# Patient Record
Sex: FEMALE | Race: WHITE | NOT HISPANIC OR LATINO | Employment: PART TIME | URBAN - METROPOLITAN AREA
[De-identification: names, ages, dates, MRNs, and addresses within clinical notes are randomized per-mention and may not be internally consistent; named-entity substitution may affect disease eponyms.]

---
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
## 2019-02-26 ENCOUNTER — TELEPHONE (OUTPATIENT)
Dept: PEDIATRICS CLINIC | Facility: CLINIC | Age: 19
End: 2019-02-26

## 2019-02-26 NOTE — TELEPHONE ENCOUNTER
Left message on home number to have a parent or nidiadian to call back and schedule well visit  Patient is past due

## 2020-03-11 ENCOUNTER — TRANSCRIBE ORDERS (OUTPATIENT)
Dept: ADMINISTRATIVE | Age: 20
End: 2020-03-11

## 2020-03-11 ENCOUNTER — APPOINTMENT (OUTPATIENT)
Dept: LAB | Age: 20
End: 2020-03-11
Attending: PREVENTIVE MEDICINE

## 2020-03-11 DIAGNOSIS — A15.0 TB (PULMONARY TUBERCULOSIS): ICD-10-CM

## 2020-03-11 DIAGNOSIS — A15.0 TB (PULMONARY TUBERCULOSIS): Primary | ICD-10-CM

## 2020-03-11 PROCEDURE — 36415 COLL VENOUS BLD VENIPUNCTURE: CPT

## 2020-03-11 PROCEDURE — 86480 TB TEST CELL IMMUN MEASURE: CPT

## 2020-03-13 LAB
GAMMA INTERFERON BACKGROUND BLD IA-ACNC: 0.02 IU/ML
M TB IFN-G BLD-IMP: NEGATIVE
M TB IFN-G CD4+ BCKGRND COR BLD-ACNC: 0 IU/ML
M TB IFN-G CD4+ BCKGRND COR BLD-ACNC: 0 IU/ML
MITOGEN IGNF BCKGRD COR BLD-ACNC: >10 IU/ML

## 2020-10-06 ENCOUNTER — OFFICE VISIT (OUTPATIENT)
Dept: OBGYN CLINIC | Facility: CLINIC | Age: 20
End: 2020-10-06
Payer: COMMERCIAL

## 2020-10-06 VITALS
SYSTOLIC BLOOD PRESSURE: 116 MMHG | BODY MASS INDEX: 19.9 KG/M2 | DIASTOLIC BLOOD PRESSURE: 70 MMHG | WEIGHT: 126.8 LBS | HEIGHT: 67 IN

## 2020-10-06 DIAGNOSIS — Z01.419 WOMEN'S ANNUAL ROUTINE GYNECOLOGICAL EXAMINATION: ICD-10-CM

## 2020-10-06 DIAGNOSIS — Z72.51 UNPROTECTED SEXUAL INTERCOURSE: Primary | ICD-10-CM

## 2020-10-06 PROCEDURE — 87491 CHLMYD TRACH DNA AMP PROBE: CPT | Performed by: OBSTETRICS & GYNECOLOGY

## 2020-10-06 PROCEDURE — 87591 N.GONORRHOEAE DNA AMP PROB: CPT | Performed by: OBSTETRICS & GYNECOLOGY

## 2020-10-06 PROCEDURE — S0610 ANNUAL GYNECOLOGICAL EXAMINA: HCPCS | Performed by: OBSTETRICS & GYNECOLOGY

## 2020-10-07 LAB
C TRACH DNA SPEC QL NAA+PROBE: NEGATIVE
N GONORRHOEA DNA SPEC QL NAA+PROBE: NEGATIVE

## 2020-11-04 ENCOUNTER — TRANSCRIBE ORDERS (OUTPATIENT)
Dept: URGENT CARE | Facility: CLINIC | Age: 20
End: 2020-11-04

## 2020-11-04 DIAGNOSIS — Z02.1 ENCOUNTER FOR PRE-EMPLOYMENT EXAMINATION: Primary | ICD-10-CM

## 2020-11-04 PROCEDURE — U0003 INFECTIOUS AGENT DETECTION BY NUCLEIC ACID (DNA OR RNA); SEVERE ACUTE RESPIRATORY SYNDROME CORONAVIRUS 2 (SARS-COV-2) (CORONAVIRUS DISEASE [COVID-19]), AMPLIFIED PROBE TECHNIQUE, MAKING USE OF HIGH THROUGHPUT TECHNOLOGIES AS DESCRIBED BY CMS-2020-01-R: HCPCS | Performed by: NURSE PRACTITIONER

## 2020-11-06 LAB — SARS-COV-2 RNA SPEC QL NAA+PROBE: NOT DETECTED

## 2021-03-24 ENCOUNTER — IMMUNIZATIONS (OUTPATIENT)
Dept: FAMILY MEDICINE CLINIC | Facility: HOSPITAL | Age: 21
End: 2021-03-24

## 2021-03-24 DIAGNOSIS — Z23 ENCOUNTER FOR IMMUNIZATION: Primary | ICD-10-CM

## 2021-03-24 PROCEDURE — 0011A SARS-COV-2 / COVID-19 MRNA VACCINE (MODERNA) 100 MCG: CPT

## 2021-03-24 PROCEDURE — 91301 SARS-COV-2 / COVID-19 MRNA VACCINE (MODERNA) 100 MCG: CPT

## 2021-04-26 ENCOUNTER — IMMUNIZATIONS (OUTPATIENT)
Dept: FAMILY MEDICINE CLINIC | Facility: HOSPITAL | Age: 21
End: 2021-04-26

## 2021-04-26 DIAGNOSIS — Z23 ENCOUNTER FOR IMMUNIZATION: Primary | ICD-10-CM

## 2021-04-26 PROCEDURE — 0012A SARS-COV-2 / COVID-19 MRNA VACCINE (MODERNA) 100 MCG: CPT

## 2021-04-26 PROCEDURE — 91301 SARS-COV-2 / COVID-19 MRNA VACCINE (MODERNA) 100 MCG: CPT

## 2021-09-07 ENCOUNTER — OFFICE VISIT (OUTPATIENT)
Dept: OBGYN CLINIC | Facility: CLINIC | Age: 21
End: 2021-09-07
Payer: COMMERCIAL

## 2021-09-07 VITALS
BODY MASS INDEX: 19.3 KG/M2 | SYSTOLIC BLOOD PRESSURE: 102 MMHG | HEIGHT: 67 IN | DIASTOLIC BLOOD PRESSURE: 64 MMHG | WEIGHT: 123 LBS

## 2021-09-07 DIAGNOSIS — Z11.3 SCREENING FOR STDS (SEXUALLY TRANSMITTED DISEASES): ICD-10-CM

## 2021-09-07 DIAGNOSIS — Z34.82 ENCOUNTER FOR SUPERVISION OF OTHER NORMAL PREGNANCY, SECOND TRIMESTER: Primary | ICD-10-CM

## 2021-09-07 DIAGNOSIS — N91.2 AMENORRHEA: ICD-10-CM

## 2021-09-07 LAB — SL AMB POCT URINE HCG: POSITIVE

## 2021-09-07 PROCEDURE — 81025 URINE PREGNANCY TEST: CPT | Performed by: OBSTETRICS & GYNECOLOGY

## 2021-09-07 PROCEDURE — 87591 N.GONORRHOEAE DNA AMP PROB: CPT | Performed by: OBSTETRICS & GYNECOLOGY

## 2021-09-07 PROCEDURE — 87491 CHLMYD TRACH DNA AMP PROBE: CPT | Performed by: OBSTETRICS & GYNECOLOGY

## 2021-09-07 PROCEDURE — 99213 OFFICE O/P EST LOW 20 MIN: CPT | Performed by: OBSTETRICS & GYNECOLOGY

## 2021-09-07 NOTE — PROGRESS NOTES
Assessment/Plan:     There are no diagnoses linked to this encounter  22 yo female   Amenorrhea pos pregnancy iup 9w  lmp 2021  Prior 1   Plan    GC/ CT   Prenatal vitamin daily   Return to office for C OC      Subjective:      Patient ID: Suleiman Harding is a 23 y o  female  HPI   18-year-old female presents to the office today secondary to amenorrhea positive pregnancy test  LMP 2021  Denies any vaginal bleeding denies any vaginal itching or odor or discharge denies any pelvic pain denies any nausea vomiting denies any diarrhea or constipation    The following portions of the patient's history were reviewed and updated as appropriate: allergies, current medications, past family history, past medical history, past social history, past surgical history and problem list     Review of Systems      Objective:      /64 (BP Location: Left arm, Patient Position: Sitting, Cuff Size: Adult)   Ht 5' 7" (1 702 m)   Wt 55 8 kg (123 lb)   LMP 2021   BMI 19 26 kg/m²          Physical Exam  Constitutional:       Appearance: She is well-developed  Abdominal:      General: There is no distension  Palpations: Abdomen is soft  Tenderness: There is no abdominal tenderness  Genitourinary:     Labia:         Right: No rash, tenderness or lesion  Left: No rash, tenderness or lesion  Vagina: No signs of injury  No vaginal discharge, erythema or tenderness  Cervix: No cervical motion tenderness, discharge or friability  Adnexa:         Right: No mass, tenderness or fullness  Left: No mass, tenderness or fullness  Comments:  Bedside ultrasound performed IUP 9 weeks 3 days fetal heart rate 150 beats per minute  Neurological:      Mental Status: She is alert and oriented to person, place, and time     Psychiatric:         Behavior: Behavior normal

## 2021-09-08 LAB
C TRACH DNA SPEC QL NAA+PROBE: NEGATIVE
N GONORRHOEA DNA SPEC QL NAA+PROBE: NEGATIVE

## 2021-09-13 ENCOUNTER — INITIAL PRENATAL (OUTPATIENT)
Dept: OBGYN CLINIC | Facility: CLINIC | Age: 21
End: 2021-09-13
Payer: COMMERCIAL

## 2021-09-13 VITALS
WEIGHT: 125 LBS | SYSTOLIC BLOOD PRESSURE: 110 MMHG | DIASTOLIC BLOOD PRESSURE: 70 MMHG | HEIGHT: 67 IN | BODY MASS INDEX: 19.62 KG/M2

## 2021-09-13 DIAGNOSIS — Z33.1 NORMAL PREGNANCY, INCIDENTAL: Primary | ICD-10-CM

## 2021-09-13 DIAGNOSIS — Z34.81 MULTIGRAVIDA IN FIRST TRIMESTER: ICD-10-CM

## 2021-09-13 PROCEDURE — T1001 NURSING ASSESSMENT/EVALUATN: HCPCS | Performed by: OBSTETRICS & GYNECOLOGY

## 2021-09-13 NOTE — PROGRESS NOTES
OB Intake    Patient presents for OB intake interview  Accompanied by: HERSELF   Planned  pregnancy, FOB involved and supportive      Hx of  delivery prior to 36 weeks 6 days: no  Hx of hypertension:  no  Patient's last menstrual period was 2021  Estimated Date of Delivery: 2022  Signs and Symptoms of pregnancy:  - No SWELLING G OR DIZZINESS, NO VISUAL PROBLEMS,HAS NAUSEA WITH NO VOMITING   - Constipation: yes  - Headaches: No  - Cramping/spotting: No  - PICA cravings: No  - Diabetes:    History of gestational diabetes :No   BMI >35  :No   Advance maternal age >35 :No   First degree relative with type 2 diabetes :No   History of PCOS :No   Current metformin use :NO   Prior history of macrosomia or LGA :No    Prenatal labs including: CBC,ABO, Rubella, Hepatitis, RPR, HIV,cf,sma carrier screen, toxoplasmosis, varicella, hemoglobin electrophoresis  Last Pap:  2021  Tdap - counseled to be given after 27 weeks  Influenza vaccine discussed and information sheet given  Vaccinated: NO  COVID Vaccine : yes 3/24 &   Hx of MRSA: NO  Dental visit with last 6 months -  no, recommendations discussed  CATS : YES  Interview education:  [facility] Pregnancy Essentials booklet reviewed and explained  Handouts given at today's visit     [facility] phone application guide     [address] [place] [address] support center   [facility]        Sequential screening pamphlet                   Cystic fibrosis information    [facility]: Yes  Oscar Smiley form submitted: Yes    Mychart activated: Joe Salinas done by : Lidia Johnson LPN       82/60/96

## 2021-11-11 ENCOUNTER — TELEPHONE (OUTPATIENT)
Dept: OBGYN CLINIC | Facility: CLINIC | Age: 21
End: 2021-11-11

## 2021-12-20 ENCOUNTER — TELEMEDICINE (OUTPATIENT)
Dept: FAMILY MEDICINE CLINIC | Facility: CLINIC | Age: 21
End: 2021-12-20
Payer: COMMERCIAL

## 2021-12-20 VITALS — HEIGHT: 67 IN | TEMPERATURE: 97.6 F | BODY MASS INDEX: 18.83 KG/M2 | WEIGHT: 120 LBS

## 2021-12-20 DIAGNOSIS — R05.9 COUGH: Primary | ICD-10-CM

## 2021-12-20 PROCEDURE — 1036F TOBACCO NON-USER: CPT | Performed by: INTERNAL MEDICINE

## 2021-12-20 PROCEDURE — 3008F BODY MASS INDEX DOCD: CPT | Performed by: INTERNAL MEDICINE

## 2021-12-20 PROCEDURE — 99214 OFFICE O/P EST MOD 30 MIN: CPT | Performed by: INTERNAL MEDICINE

## 2021-12-20 PROCEDURE — 3725F SCREEN DEPRESSION PERFORMED: CPT | Performed by: INTERNAL MEDICINE

## 2021-12-20 RX ORDER — AZITHROMYCIN 250 MG/1
TABLET, FILM COATED ORAL
Qty: 6 TABLET | Refills: 0 | Status: SHIPPED | OUTPATIENT
Start: 2021-12-20 | End: 2021-12-24

## 2021-12-20 RX ORDER — ALBUTEROL SULFATE 90 UG/1
2 AEROSOL, METERED RESPIRATORY (INHALATION) EVERY 6 HOURS PRN
Qty: 18 G | Refills: 5 | Status: SHIPPED | OUTPATIENT
Start: 2021-12-20

## 2022-01-20 ENCOUNTER — TELEPHONE (OUTPATIENT)
Dept: FAMILY MEDICINE CLINIC | Facility: CLINIC | Age: 22
End: 2022-01-20

## 2022-01-20 DIAGNOSIS — Z03.818 ENCOUNTER FOR OBSERVATION FOR SUSPECTED EXPOSURE TO OTHER BIOLOGICAL AGENTS RULED OUT: Primary | ICD-10-CM

## 2022-01-20 PROCEDURE — U0005 INFEC AGEN DETEC AMPLI PROBE: HCPCS | Performed by: INTERNAL MEDICINE

## 2022-01-20 PROCEDURE — U0003 INFECTIOUS AGENT DETECTION BY NUCLEIC ACID (DNA OR RNA); SEVERE ACUTE RESPIRATORY SYNDROME CORONAVIRUS 2 (SARS-COV-2) (CORONAVIRUS DISEASE [COVID-19]), AMPLIFIED PROBE TECHNIQUE, MAKING USE OF HIGH THROUGHPUT TECHNOLOGIES AS DESCRIBED BY CMS-2020-01-R: HCPCS | Performed by: INTERNAL MEDICINE

## 2022-01-20 NOTE — TELEPHONE ENCOUNTER
Today did a home Covid test & it was positive  She's asking if she can get order for PCR  Has body aches, sore throat, cough, ha   Can she get an order?

## 2022-04-19 ENCOUNTER — APPOINTMENT (EMERGENCY)
Dept: CT IMAGING | Facility: HOSPITAL | Age: 22
End: 2022-04-19
Payer: MEDICARE

## 2022-04-19 ENCOUNTER — HOSPITAL ENCOUNTER (EMERGENCY)
Facility: HOSPITAL | Age: 22
Discharge: HOME | End: 2022-04-19
Attending: INTERNAL MEDICINE
Payer: MEDICARE

## 2022-04-19 VITALS
WEIGHT: 118.39 LBS | SYSTOLIC BLOOD PRESSURE: 94 MMHG | DIASTOLIC BLOOD PRESSURE: 58 MMHG | HEART RATE: 98 BPM | RESPIRATION RATE: 17 BRPM | OXYGEN SATURATION: 99 % | BODY MASS INDEX: 17.94 KG/M2 | HEIGHT: 68 IN | TEMPERATURE: 98 F

## 2022-04-19 DIAGNOSIS — R11.2 NAUSEA AND VOMITING, UNSPECIFIED VOMITING TYPE: ICD-10-CM

## 2022-04-19 DIAGNOSIS — R10.31 RIGHT LOWER QUADRANT ABDOMINAL PAIN: Primary | ICD-10-CM

## 2022-04-19 LAB
ALBUMIN SERPL BCP-MCNC: 4.5 G/DL (ref 3.5–5)
ALP SERPL-CCNC: 52 U/L (ref 34–104)
ALT SERPL W P-5'-P-CCNC: 12 U/L (ref 7–52)
ANION GAP SERPL CALCULATED.3IONS-SCNC: 9 MMOL/L (ref 4–13)
AST SERPL W P-5'-P-CCNC: 15 U/L (ref 13–39)
BASOPHILS # BLD MANUAL: 0 THOUSAND/UL (ref 0–0.1)
BASOPHILS NFR MAR MANUAL: 0 % (ref 0–1)
BILIRUB SERPL-MCNC: 0.85 MG/DL (ref 0.2–1)
BILIRUB UR QL STRIP: NEGATIVE
BUN SERPL-MCNC: 19 MG/DL (ref 5–25)
CALCIUM SERPL-MCNC: 9.3 MG/DL (ref 8.4–10.2)
CHLORIDE SERPL-SCNC: 104 MMOL/L (ref 96–108)
CLARITY UR: CLEAR
CO2 SERPL-SCNC: 26 MMOL/L (ref 21–32)
COLOR UR: YELLOW
CREAT SERPL-MCNC: 0.78 MG/DL (ref 0.6–1.3)
EOSINOPHIL # BLD MANUAL: 0 THOUSAND/UL (ref 0–0.4)
EOSINOPHIL NFR BLD MANUAL: 0 % (ref 0–6)
ERYTHROCYTE [DISTWIDTH] IN BLOOD BY AUTOMATED COUNT: 12.3 % (ref 11.6–15.1)
EXT PREG TEST URINE: NEGATIVE
EXT. CONTROL ED NAV: NORMAL
GFR SERPL CREATININE-BSD FRML MDRD: 108 ML/MIN/1.73SQ M
GLUCOSE SERPL-MCNC: 118 MG/DL (ref 65–140)
GLUCOSE UR STRIP-MCNC: NEGATIVE MG/DL
HCT VFR BLD AUTO: 45.3 % (ref 34.8–46.1)
HGB BLD-MCNC: 14.8 G/DL (ref 11.5–15.4)
HGB UR QL STRIP.AUTO: NEGATIVE
KETONES UR STRIP-MCNC: NEGATIVE MG/DL
LEUKOCYTE ESTERASE UR QL STRIP: NEGATIVE
LIPASE SERPL-CCNC: 37 U/L (ref 11–82)
LYMPHOCYTES # BLD AUTO: 0.48 THOUSAND/UL (ref 0.6–4.47)
LYMPHOCYTES # BLD AUTO: 4 % (ref 14–44)
MCH RBC QN AUTO: 29 PG (ref 26.8–34.3)
MCHC RBC AUTO-ENTMCNC: 32.7 G/DL (ref 31.4–37.4)
MCV RBC AUTO: 89 FL (ref 82–98)
METAMYELOCYTES NFR BLD MANUAL: 1 % (ref 0–1)
MONOCYTES # BLD AUTO: 0.24 THOUSAND/UL (ref 0–1.22)
MONOCYTES NFR BLD: 2 % (ref 4–12)
NEUTROPHILS # BLD MANUAL: 11.23 THOUSAND/UL (ref 1.85–7.62)
NEUTS BAND NFR BLD MANUAL: 10 % (ref 0–8)
NEUTS SEG NFR BLD AUTO: 83 % (ref 43–75)
NITRITE UR QL STRIP: NEGATIVE
OVALOCYTES BLD QL SMEAR: PRESENT
PH UR STRIP.AUTO: 6 [PH]
PLATELET # BLD AUTO: 214 THOUSANDS/UL (ref 149–390)
PLATELET BLD QL SMEAR: ADEQUATE
PMV BLD AUTO: 10.9 FL (ref 8.9–12.7)
POIKILOCYTOSIS BLD QL SMEAR: PRESENT
POTASSIUM SERPL-SCNC: 4 MMOL/L (ref 3.5–5.3)
PROT SERPL-MCNC: 7.1 G/DL (ref 6.4–8.4)
PROT UR STRIP-MCNC: NEGATIVE MG/DL
RBC # BLD AUTO: 5.1 MILLION/UL (ref 3.81–5.12)
RBC MORPH BLD: PRESENT
SODIUM SERPL-SCNC: 139 MMOL/L (ref 135–147)
SP GR UR STRIP.AUTO: 1.02 (ref 1–1.03)
UROBILINOGEN UR QL STRIP.AUTO: 0.2 E.U./DL
WBC # BLD AUTO: 12.08 THOUSAND/UL (ref 4.31–10.16)

## 2022-04-19 PROCEDURE — G1004 CDSM NDSC: HCPCS

## 2022-04-19 PROCEDURE — 85007 BL SMEAR W/DIFF WBC COUNT: CPT | Performed by: INTERNAL MEDICINE

## 2022-04-19 PROCEDURE — 99284 EMERGENCY DEPT VISIT MOD MDM: CPT | Performed by: INTERNAL MEDICINE

## 2022-04-19 PROCEDURE — 81003 URINALYSIS AUTO W/O SCOPE: CPT | Performed by: INTERNAL MEDICINE

## 2022-04-19 PROCEDURE — 74177 CT ABD & PELVIS W/CONTRAST: CPT

## 2022-04-19 PROCEDURE — 85027 COMPLETE CBC AUTOMATED: CPT | Performed by: INTERNAL MEDICINE

## 2022-04-19 PROCEDURE — 81025 URINE PREGNANCY TEST: CPT | Performed by: INTERNAL MEDICINE

## 2022-04-19 PROCEDURE — 83690 ASSAY OF LIPASE: CPT | Performed by: INTERNAL MEDICINE

## 2022-04-19 PROCEDURE — 36415 COLL VENOUS BLD VENIPUNCTURE: CPT | Performed by: INTERNAL MEDICINE

## 2022-04-19 PROCEDURE — 99284 EMERGENCY DEPT VISIT MOD MDM: CPT

## 2022-04-19 PROCEDURE — 96361 HYDRATE IV INFUSION ADD-ON: CPT

## 2022-04-19 PROCEDURE — 96374 THER/PROPH/DIAG INJ IV PUSH: CPT

## 2022-04-19 PROCEDURE — 80053 COMPREHEN METABOLIC PANEL: CPT | Performed by: INTERNAL MEDICINE

## 2022-04-19 PROCEDURE — 96375 TX/PRO/DX INJ NEW DRUG ADDON: CPT

## 2022-04-19 RX ORDER — ONDANSETRON 4 MG/1
4 TABLET, FILM COATED ORAL EVERY 6 HOURS
Qty: 12 TABLET | Refills: 0 | Status: SHIPPED | OUTPATIENT
Start: 2022-04-19 | End: 2022-05-03

## 2022-04-19 RX ORDER — ONDANSETRON 2 MG/ML
4 INJECTION INTRAMUSCULAR; INTRAVENOUS ONCE
Status: COMPLETED | OUTPATIENT
Start: 2022-04-19 | End: 2022-04-19

## 2022-04-19 RX ADMIN — SODIUM CHLORIDE 1000 ML: 0.9 INJECTION, SOLUTION INTRAVENOUS at 11:29

## 2022-04-19 RX ADMIN — IOHEXOL 100 ML: 350 INJECTION, SOLUTION INTRAVENOUS at 14:00

## 2022-04-19 RX ADMIN — FAMOTIDINE 20 MG: 10 INJECTION, SOLUTION INTRAVENOUS at 11:30

## 2022-04-19 RX ADMIN — ONDANSETRON 4 MG: 2 INJECTION INTRAMUSCULAR; INTRAVENOUS at 11:30

## 2022-04-19 NOTE — ED PROVIDER NOTES
History  Chief Complaint   Patient presents with    Vomiting     Pt  states started vomiting at 3 am      This is 17y old came for having vomiting since 3:00am , can not stop it , pt has no diarrhea or constipation and she moved her bowel this morning  Pt has no fever  Pt has epigastric pain  Pt denies urinary symptoms  Pt has no abdominal surgery  Pt has h/o of anemia , depression and asthma  LMP end of last month  Pt last meal last night and she did not vomit after that  Pt denies sob, chest pain  Prior to Admission Medications   Prescriptions Last Dose Informant Patient Reported? Taking? Prenatal Vit-Fe Fumarate-FA (PRENATAL VITAMINS PO)   Yes No   Sig: Take by mouth   albuterol (Ventolin HFA) 90 mcg/act inhaler   No No   Sig: Inhale 2 puffs every 6 (six) hours as needed for wheezing      Facility-Administered Medications: None       Past Medical History:   Diagnosis Date    Anemia     Depression     Polycystic ovary syndrome        History reviewed  No pertinent surgical history  Family History   Problem Relation Age of Onset    Diabetes Mother     Cervical cancer Mother     Heart disease Brother     No Known Problems Paternal Grandmother     No Known Problems Father     No Known Problems Sister     No Known Problems Daughter     Heart attack Maternal Grandmother     Heart attack Maternal Grandfather     No Known Problems Paternal Grandfather     No Known Problems Sister     No Known Problems Brother     No Known Problems Brother      I have reviewed and agree with the history as documented      E-Cigarette/Vaping    E-Cigarette Use Current Every Day User      E-Cigarette/Vaping Substances    Nicotine Yes     THC No     CBD No     Flavoring Yes      Social History     Tobacco Use    Smoking status: Former Smoker     Packs/day: 0 00     Years: 2 00     Pack years: 0 00     Types: Cigarettes    Smokeless tobacco: Former User    Tobacco comment: Quit    hyears ago    Vaping Use  Vaping Use: Every day    Substances: Nicotine, Flavoring   Substance Use Topics    Alcohol use: Yes     Alcohol/week: 0 0 standard drinks     Comment: rare    Drug use: Never     Comment: none       Review of Systems   Constitutional: Negative for fatigue and fever  HENT: Negative for postnasal drip, rhinorrhea, sinus pressure, sinus pain, sneezing, sore throat and trouble swallowing  Eyes: Negative for visual disturbance  Respiratory: Negative for cough and shortness of breath  Cardiovascular: Negative for chest pain and palpitations  Gastrointestinal: Positive for abdominal pain, nausea and vomiting  Negative for diarrhea  Genitourinary: Negative for difficulty urinating, dysuria, flank pain, frequency and hematuria  Musculoskeletal: Negative for back pain, myalgias, neck pain and neck stiffness  Skin: Negative for color change, pallor and rash  Neurological: Negative for dizziness, weakness, light-headedness and headaches  Hematological: Negative for adenopathy  Does not bruise/bleed easily  Psychiatric/Behavioral: Negative for agitation and behavioral problems  Physical Exam  Physical Exam  Vitals and nursing note reviewed  Constitutional:       General: She is not in acute distress  Appearance: She is well-developed and normal weight  She is not ill-appearing, toxic-appearing or diaphoretic  HENT:      Head: Normocephalic and atraumatic  Right Ear: Tympanic membrane and ear canal normal       Left Ear: Tympanic membrane and ear canal normal       Nose: Nose normal  No congestion or rhinorrhea  Mouth/Throat:      Mouth: Mucous membranes are moist       Pharynx: No oropharyngeal exudate or posterior oropharyngeal erythema  Eyes:      General: No scleral icterus  Right eye: No discharge  Left eye: No discharge  Pupils: Pupils are equal, round, and reactive to light  Neck:      Vascular: No carotid bruit     Cardiovascular:      Rate and Rhythm: Normal rate and regular rhythm  Heart sounds: Normal heart sounds  No murmur heard  No friction rub  No gallop  Pulmonary:      Effort: Pulmonary effort is normal  No respiratory distress  Breath sounds: Normal breath sounds  No wheezing, rhonchi or rales  Chest:      Chest wall: No tenderness  Abdominal:      General: Bowel sounds are normal  There is no distension  Palpations: Abdomen is soft  There is no mass  Tenderness: There is no abdominal tenderness  There is no right CVA tenderness, left CVA tenderness, guarding or rebound  Hernia: No hernia is present  Musculoskeletal:         General: No swelling, tenderness, deformity or signs of injury  Normal range of motion  Cervical back: Normal range of motion and neck supple  No rigidity or tenderness  Right lower leg: No edema  Left lower leg: No edema  Lymphadenopathy:      Cervical: No cervical adenopathy  Skin:     General: Skin is warm and dry  Capillary Refill: Capillary refill takes less than 2 seconds  Coloration: Skin is not jaundiced or pale  Findings: No bruising, erythema, lesion or rash  Neurological:      General: No focal deficit present  Mental Status: She is alert and oriented to person, place, and time     Psychiatric:         Mood and Affect: Mood normal          Behavior: Behavior normal          Vital Signs  ED Triage Vitals   Temperature Pulse Respirations Blood Pressure SpO2   04/19/22 1116 04/19/22 1116 04/19/22 1116 04/19/22 1116 04/19/22 1116   98 °F (36 7 °C) (!) 113 16 97/60 99 %      Temp Source Heart Rate Source Patient Position - Orthostatic VS BP Location FiO2 (%)   04/19/22 1116 04/19/22 1116 04/19/22 1116 04/19/22 1408 --   Oral Monitor Sitting Left arm       Pain Score       04/19/22 1116       No Pain           Vitals:    04/19/22 1116 04/19/22 1408   BP: 97/60 94/58   Pulse: (!) 113 98   Patient Position - Orthostatic VS: Sitting Lying Visual Acuity      ED Medications  Medications   sodium chloride 0 9 % bolus 1,000 mL (0 mL Intravenous Stopped 4/19/22 1300)   ondansetron (ZOFRAN) injection 4 mg (4 mg Intravenous Given 4/19/22 1130)   famotidine (PEPCID) injection 20 mg (20 mg Intravenous Given 4/19/22 1130)   iohexol (OMNIPAQUE) 350 MG/ML injection (SINGLE-DOSE) 100 mL (100 mL Intravenous Given 4/19/22 1400)       Diagnostic Studies  Results Reviewed     Procedure Component Value Units Date/Time    UA w Reflex to Microscopic w Reflex to Culture [[id number]]  (Normal) Collected: 04/19/22 1222    Lab Status: Final result Specimen: Urine, Clean Catch Updated: 04/19/22 1238     Color, UA Yellow     Clarity, UA Clear     Specific Gravity, UA 1 025     pH, UA 6 0     Leukocytes, UA Negative     Nitrite, UA Negative     Protein, UA Negative mg/dl      Glucose, UA Negative mg/dl      Ketones, UA Negative mg/dl      Urobilinogen, UA 0 2 E U /dl      Bilirubin, UA Negative     Blood, UA Negative    POCT pregnancy, urine [[id number]]  (Normal) Resulted: 04/19/22 1233    Lab Status: Final result Updated: 04/19/22 1234     EXT PREG TEST UR (Ref: Negative) negATIVE     Control valid    CBC and differential [[id number]]  (Abnormal) Collected: 04/19/22 1129    Lab Status: Final result Specimen: Blood from Arm, Left Updated: 04/19/22 1212     WBC 12 08 Thousand/uL      RBC 5 10 Million/uL      Hemoglobin 14 8 g/dL      Hematocrit 45 3 %      MCV 89 fL      MCH 29 0 pg      MCHC 32 7 g/dL      RDW 12 3 %      MPV 10 9 fL      Platelets 596 Thousands/uL     Narrative: This is an appended report  These results have been appended to a previously verified report      Manual Differential(PHLEBS Do Not Order) [[id number]]  (Abnormal) Collected: 04/19/22 1129    Lab Status: Final result Specimen: Blood from Arm, Left Updated: 04/19/22 1212     Segmented % 83 %      Bands % 10 %      Lymphocytes % 4 %      Monocytes % 2 %      Eosinophils, % 0 %      Basophils % 0 %      Metamyelocytes% 1 %      Absolute Neutrophils 11 23 Thousand/uL      Lymphocytes Absolute 0 48 Thousand/uL      Monocytes Absolute 0 24 Thousand/uL      Eosinophils Absolute 0 00 Thousand/uL      Basophils Absolute 0 00 Thousand/uL      Total Counted --     RBC Morphology Present     Ovalocytes Present     Poikilocytes Present     Platelet Estimate Adequate    Comprehensive metabolic panel [[id number]] Collected: 04/19/22 1129    Lab Status: Final result Specimen: Blood from Arm, Left Updated: 04/19/22 1203     Sodium 139 mmol/L      Potassium 4 0 mmol/L      Chloride 104 mmol/L      CO2 26 mmol/L      ANION GAP 9 mmol/L      BUN 19 mg/dL      Creatinine 0 78 mg/dL      Glucose 118 mg/dL      Calcium 9 3 mg/dL      AST 15 U/L      ALT 12 U/L      Alkaline Phosphatase 52 U/L      Total Protein 7 1 g/dL      Albumin 4 5 g/dL      Total Bilirubin 0 85 mg/dL      eGFR 108 ml/min/1 73sq m     Narrative:      [place] guidelines for Chronic Kidney Disease (CKD):     Stage 1 with normal or high GFR (GFR > 90 mL/min/1 73 square meters)    Stage 2 Mild CKD (GFR = 60-89 mL/min/1 73 square meters)    Stage 3A Moderate CKD (GFR = 45-59 mL/min/1 73 square meters)    Stage 3B Moderate CKD (GFR = 30-44 mL/min/1 73 square meters)    Stage 4 Severe CKD (GFR = 15-29 mL/min/1 73 square meters)    Stage 5 End Stage CKD (GFR <15 mL/min/1 73 square meters)  Note: GFR calculation is accurate only with a steady state creatinine    Lipase [[id number]]  (Normal) Collected: 04/19/22 1129    Lab Status: Final result Specimen: Blood from Arm, Left Updated: 04/19/22 1203     Lipase 37 u/L                  CT abdomen pelvis w contrast   Final Result by Elio Vences MD (04/19 1430)      1  No acute inflammatory changes within the abdomen or pelvis  No evidence of appendicitis   2  No bowel obstruction or hydronephrosis   3   Nonspecific small amount of free fluid in the pelvis may be physiologic Workstation performed: [id number]                    Procedures  Procedures         ED Course  ED Course as of 04/20/22 0916   Tue Apr 19, 2022   1308 Pt re examined and abdomen; mild tenderness at RLQ area  No rebound tenderness, will go for CT abdomen and pelvis  [facility]  Number of Diagnoses or Management Options  Diagnosis management comments: This is 17y old came for having vomitig since 3:00am  On arrival abdominal exam was normal  2nd exam shows tenderness at RLQ  Pt has WBC 12 8k, with L shift  So ordered ct Abdomen and pelvis to R/O Appendicitis  CT came back negative for appendicitis  Pt felt better with treatement  Will discharge home on zofran  All questions and concerns of pt had been addressed  Amount and/or Complexity of Data Reviewed  Clinical lab tests: ordered  Tests in the radiology section of CPT®: ordered and reviewed    Risk of Complications, Morbidity, and/or Mortality  Presenting problems: moderate  Diagnostic procedures: moderate  Management options: moderate        Disposition  Final diagnoses:   Right lower quadrant abdominal pain   Nausea and vomiting, unspecified vomiting type     Time reflects when diagnosis was documented in both MDM as applicable and the Disposition within this note     Time User Action Codes Description Comment    4/19/2022  2:47 PM Cherelle Grant Add [R10 31] Right lower quadrant abdominal pain     4/19/2022  2:48 PM Cherelle Grant Add [R11 2] Nausea and vomiting, unspecified vomiting type       ED Disposition     ED Disposition Condition Date/Time Comment    Discharge Stable Tue Apr 19, 2022  2:47 PM Radha Zhu discharge to home/self care              Follow-up Information     Follow up With Specialties Details Why Contact Info    Maite Hermosillo MD Internal Medicine, Pediatrics In 3 days  [address]  [address] [address]  [phone]            Discharge Medication List as of 4/19/2022  2:49 PM      START taking these medications    Details   ondansetron (ZOFRAN) 4 mg tablet Take 1 tablet (4 mg total) by mouth every 6 (six) hours for 12 doses, Starting Tue 4/19/2022, Until Fri 4/22/2022, Normal         CONTINUE these medications which have NOT CHANGED    Details   albuterol (Ventolin HFA) 90 mcg/act inhaler Inhale 2 puffs every 6 (six) hours as needed for wheezing, Starting Mon 12/20/2021, Normal      Prenatal Vit-Fe Fumarate-FA (PRENATAL VITAMINS PO) Take by mouth, Historical Med             No discharge procedures on file      PDMP Review     None          ED Provider  Electronically Signed by           Charlyne Ahumada, MD  04/20/22 9457

## 2022-04-19 NOTE — DISCHARGE INSTRUCTIONS
Follow up with dr Mojgan Olsen  Take medications as prescribed  Drink plenty of fluids  Labs Reviewed   CBC AND DIFFERENTIAL - Abnormal       Result Value Ref Range Status    WBC 12 08 (*) 4 31 - 10 16 Thousand/uL Final    RBC 5 10  3 81 - 5 12 Million/uL Final    Hemoglobin 14 8  11 5 - 15 4 g/dL Final    Hematocrit 45 3  34 8 - 46 1 % Final    MCV 89  82 - 98 fL Final    MCH 29 0  26 8 - 34 3 pg Final    MCHC 32 7  31 4 - 37 4 g/dL Final    RDW 12 3  11 6 - 15 1 % Final    MPV 10 9  8 9 - 12 7 fL Final    Platelets 904  978 - 390 Thousands/uL Final    Narrative: This is an appended report  These results have been appended to a previously verified report     MANUAL DIFFERENTIAL(PHLEBS DO NOT ORDER) - Abnormal    Segmented % 83 (*) 43 - 75 % Final    Bands % 10 (*) 0 - 8 % Final    Lymphocytes % 4 (*) 14 - 44 % Final    Monocytes % 2 (*) 4 - 12 % Final    Eosinophils, % 0  0 - 6 % Final    Basophils % 0  0 - 1 % Final    Metamyelocytes% 1  0 - 1 % Final    Absolute Neutrophils 11 23 (*) 1 85 - 7 62 Thousand/uL Final    Lymphocytes Absolute 0 48 (*) 0 60 - 4 47 Thousand/uL Final    Monocytes Absolute 0 24  0 00 - 1 22 Thousand/uL Final    Eosinophils Absolute 0 00  0 00 - 0 40 Thousand/uL Final    Basophils Absolute 0 00  0 00 - 0 10 Thousand/uL Final    Total Counted     Final    RBC Morphology Present   Final    Ovalocytes Present   Final    Poikilocytes Present   Final    Platelet Estimate Adequate  Adequate Final   LIPASE - Normal    Lipase 37  11 - 82 u/L Final   UA W REFLEX TO MICROSCOPIC WITH REFLEX TO CULTURE - Normal    Color, UA Yellow  Yellow Final    Clarity, UA Clear  Clear Final    Specific Gravity, UA 1 025  1 001 - 1 030 Final    pH, UA 6 0  5 0, 5 5, 6 0, 6 5, 7 0, 7 5, 8 0 Final    Leukocytes, UA Negative  Negative Final    Nitrite, UA Negative  Negative Final    Protein, UA Negative  Negative, Interference- unable to analyze mg/dl Final    Glucose, UA Negative  Negative mg/dl Final    Ketones, UA Negative  Negative mg/dl Final    Urobilinogen, UA 0 2  0 2, 1 0 E U /dl E U /dl Final    Bilirubin, UA Negative  Negative Final    Blood, UA Negative  Negative Final   POCT PREGNANCY, URINE - Normal    EXT PREG TEST UR (Ref: Negative) negATIVE   Final    Control valid   Final   COMPREHENSIVE METABOLIC PANEL    Sodium 294  135 - 147 mmol/L Final    Potassium 4 0  3 5 - 5 3 mmol/L Final    Chloride 104  96 - 108 mmol/L Final    CO2 26  21 - 32 mmol/L Final    ANION GAP 9  4 - 13 mmol/L Final    BUN 19  5 - 25 mg/dL Final    Creatinine 0 78  0 60 - 1 30 mg/dL Final    Comment: Standardized to IDMS reference method    Glucose 118  65 - 140 mg/dL Final    Comment: If the patient is fasting, the ADA then defines impaired fasting glucose as > 100 mg/dL and diabetes as > or equal to 123 mg/dL  Specimen collection should occur prior to Sulfasalazine administration due to the potential for falsely depressed results  Specimen collection should occur prior to Sulfapyridine administration due to the potential for falsely elevated results  Calcium 9 3  8 4 - 10 2 mg/dL Final    AST 15  13 - 39 U/L Final    Comment: Specimen collection should occur prior to Sulfasalazine administration due to the potential for falsely depressed results  ALT 12  7 - 52 U/L Final    Comment: Specimen collection should occur prior to Sulfasalazine administration due to the potential for falsely depressed results       Alkaline Phosphatase 52  34 - 104 U/L Final    Total Protein 7 1  6 4 - 8 4 g/dL Final    Albumin 4 5  3 5 - 5 0 g/dL Final    Total Bilirubin 0 85  0 20 - 1 00 mg/dL Final    eGFR 108  ml/min/1 73sq m Final    Narrative:     [place] guidelines for Chronic Kidney Disease (CKD):     Stage 1 with normal or high GFR (GFR > 90 mL/min/1 73 square meters)    Stage 2 Mild CKD (GFR = 60-89 mL/min/1 73 square meters)    Stage 3A Moderate CKD (GFR = 45-59 mL/min/1 73 square meters)    Stage 3B Moderate CKD (GFR = 30-44 mL/min/1 73 square meters)    Stage 4 Severe CKD (GFR = 15-29 mL/min/1 73 square meters)    Stage 5 End Stage CKD (GFR <15 mL/min/1 73 square meters)  Note: GFR calculation is accurate only with a steady state creatinine     CT abdomen pelvis w contrast   Final Result      1  No acute inflammatory changes within the abdomen or pelvis  No evidence of appendicitis   2  No bowel obstruction or hydronephrosis   3   Nonspecific small amount of free fluid in the pelvis may be physiologic               Workstation performed: [id number]

## 2022-04-19 NOTE — Clinical Note
Tasha Aimee was seen and treated in our emergency department on 4/19/2022  No restrictions            Diagnosis:     Domnick Kussmaul  may return to school on return date  She may return on this date: 04/20/2022         If you have any questions or concerns, please don't hesitate to call        Shannan Stacy RN    ______________________________           _______________          _______________  Hospital Representative                              Date                                Time

## 2022-05-03 ENCOUNTER — OFFICE VISIT (OUTPATIENT)
Dept: FAMILY MEDICINE CLINIC | Facility: CLINIC | Age: 22
End: 2022-05-03
Payer: MEDICARE

## 2022-05-03 VITALS
OXYGEN SATURATION: 99 % | TEMPERATURE: 98.4 F | SYSTOLIC BLOOD PRESSURE: 110 MMHG | RESPIRATION RATE: 16 BRPM | BODY MASS INDEX: 19 KG/M2 | HEART RATE: 110 BPM | WEIGHT: 125.38 LBS | HEIGHT: 68 IN | DIASTOLIC BLOOD PRESSURE: 60 MMHG

## 2022-05-03 DIAGNOSIS — J32.9 SINUSITIS, UNSPECIFIED CHRONICITY, UNSPECIFIED LOCATION: Primary | ICD-10-CM

## 2022-05-03 PROCEDURE — 99213 OFFICE O/P EST LOW 20 MIN: CPT | Performed by: INTERNAL MEDICINE

## 2022-05-03 RX ORDER — AMOXICILLIN AND CLAVULANATE POTASSIUM 875; 125 MG/1; MG/1
1 TABLET, FILM COATED ORAL EVERY 12 HOURS SCHEDULED
Qty: 20 TABLET | Refills: 0 | Status: SHIPPED | OUTPATIENT
Start: 2022-05-03 | End: 2022-05-13

## 2022-05-03 NOTE — PROGRESS NOTES
Assessment/Plan: Likely bacterial infection moved in on top of viral process/allergies-will cover with Augmentin and see how it goes         Problem List Items Addressed This Visit     None      Visit Diagnoses     Sinusitis, unspecified chronicity, unspecified location    -  Primary    Relevant Medications    amoxicillin-clavulanate (Augmentin) 875-125 mg per tablet            Subjective:      Patient ID: Joyce Cazares is a 24 y o  female  Marilynn started with congestion, sore throat, no fever, tooth pain, now blowing thick green drainage and coughing up thick phlegm      The following portions of the patient's history were reviewed and updated as appropriate:   Past Medical History:  She has a past medical history of Anemia, Depression, Miscarriage, and Polycystic ovary syndrome  ,  _______________________________________________________________________  Medical Problems:  does not have a problem list on file ,  _______________________________________________________________________  Past Surgical History:   has a past surgical history that includes No past surgeries  ,  _______________________________________________________________________  Family History:  family history includes Cervical cancer in her mother; Diabetes in her mother; Heart attack in her maternal grandfather and maternal grandmother; Heart disease in her brother; No Known Problems in her brother, brother, daughter, father, paternal grandfather, paternal grandmother, sister, and sister  ,  _______________________________________________________________________  Social History:   reports that she has quit smoking  Her smoking use included cigarettes  She smoked 0 00 packs per day for 2 00 years  She has quit using smokeless tobacco  She reports current alcohol use  She reports that she does not use drugs  ,  _______________________________________________________________________  Allergies:  has No Known Allergies     _______________________________________________________________________  Current Outpatient Medications   Medication Sig Dispense Refill    albuterol (Ventolin HFA) 90 mcg/act inhaler Inhale 2 puffs every 6 (six) hours as needed for wheezing 18 g 5    amoxicillin-clavulanate (Augmentin) 875-125 mg per tablet Take 1 tablet by mouth every 12 (twelve) hours for 10 days 20 tablet 0     No current facility-administered medications for this visit      _______________________________________________________________________  Review of Systems   Constitutional: Negative for fever  HENT: Positive for congestion, sinus pressure, sinus pain and sore throat  Respiratory: Positive for cough  Objective:  Vitals:    05/03/22 1418   BP: 110/60   BP Location: Left arm   Patient Position: Sitting   Cuff Size: Adult   Pulse: (!) 110   Resp: 16   Temp: 98 4 °F (36 9 °C)   TempSrc: Temporal   SpO2: 99%   Weight: 56 9 kg (125 lb 6 oz)   Height: 5' 8" (1 727 m)     Body mass index is 19 06 kg/m²  Physical Exam  Constitutional:       Appearance: She is well-developed  HENT:      Head: Normocephalic and atraumatic  Right Ear: Tympanic membrane and ear canal normal       Left Ear: Tympanic membrane and ear canal normal       Nose: Congestion present  Mouth/Throat:      Pharynx: Posterior oropharyngeal erythema present  Eyes:      Extraocular Movements:      Right eye: Normal extraocular motion  Left eye: Normal extraocular motion  Conjunctiva/sclera: Conjunctivae normal    Cardiovascular:      Rate and Rhythm: Normal rate and regular rhythm  Heart sounds: Normal heart sounds  Pulmonary:      Effort: Pulmonary effort is normal       Breath sounds: Normal breath sounds  No stridor  Musculoskeletal:      Cervical back: Normal range of motion and neck supple  Neurological:      General: No focal deficit present        Mental Status: She is alert and oriented to person, place, and time    Psychiatric:         Mood and Affect: Mood normal          Behavior: Behavior normal

## 2022-05-06 ENCOUNTER — OFFICE VISIT (OUTPATIENT)
Dept: OBGYN CLINIC | Facility: CLINIC | Age: 22
End: 2022-05-06
Payer: MEDICARE

## 2022-05-06 VITALS
WEIGHT: 123.4 LBS | DIASTOLIC BLOOD PRESSURE: 64 MMHG | BODY MASS INDEX: 18.7 KG/M2 | HEIGHT: 68 IN | SYSTOLIC BLOOD PRESSURE: 114 MMHG

## 2022-05-06 DIAGNOSIS — Z30.9 ENCOUNTER FOR CONTRACEPTIVE MANAGEMENT, UNSPECIFIED TYPE: ICD-10-CM

## 2022-05-06 DIAGNOSIS — Z11.3 SCREENING EXAMINATION FOR STD (SEXUALLY TRANSMITTED DISEASE): ICD-10-CM

## 2022-05-06 DIAGNOSIS — Z30.013 ENCOUNTER FOR INITIAL PRESCRIPTION OF INJECTABLE CONTRACEPTIVE: Primary | ICD-10-CM

## 2022-05-06 PROCEDURE — 87491 CHLMYD TRACH DNA AMP PROBE: CPT | Performed by: PHYSICIAN ASSISTANT

## 2022-05-06 PROCEDURE — 99214 OFFICE O/P EST MOD 30 MIN: CPT | Performed by: PHYSICIAN ASSISTANT

## 2022-05-06 PROCEDURE — 87591 N.GONORRHOEAE DNA AMP PROB: CPT | Performed by: PHYSICIAN ASSISTANT

## 2022-05-06 RX ORDER — MEDROXYPROGESTERONE ACETATE 150 MG/ML
150 INJECTION, SUSPENSION INTRAMUSCULAR
Qty: 1 ML | Refills: 1 | Status: SHIPPED | OUTPATIENT
Start: 2022-05-06

## 2022-05-06 NOTE — PROGRESS NOTES
Assessment/Plan:    No problem-specific Assessment & Plan notes found for this encounter  Diagnoses and all orders for this visit:    Encounter for initial prescription of injectable contraceptive    Encounter for contraceptive management, unspecified type  -     medroxyPROGESTERone (DEPO-PROVERA) 150 mg/mL injection; Inject 1 mL (150 mg total) into a muscle every 3 (three) months    Screening examination for STD (sexually transmitted disease)  -     Chlamydia/GC amplified DNA by PCR        Urine sample sent for GC/chlamydia screening; we will call with results  Patient would like to go back on Depo Provera  Rx sent to pharmacy  Will administer first dose during first 5 days of patient's next period; due around May 18  Can schedule nurse visit today  Stressed consistent condom use for STD and pregnancy prevention  F/u in the next few weeks for annual gyn exam   Call with problems in the interim  Subjective:      Patient ID: Tasha Yu is a 24 y o  female  Patient is here to discuss contraception  States she is doing well overall  Had an SAB in November 2021  Periods since then are regular every 28 days, and bleeding lasts for 4-5 days  She denies heavy bleeding, severe cramping, HA, and mood symptoms  Tried OCPs in the past, but does not remember to take them on time  Was also on Depo Provera  She is sexually active and does not use condoms; current partner is new  Patient denies bowel/bladder changes, pelvic pain, bloating, abdominal pain, n/v, and change in appetite  The following portions of the patient's history were reviewed and updated as appropriate: allergies, current medications, past family history, past medical history, past social history, past surgical history and problem list     Review of Systems   Constitutional: Negative for appetite change and unexpected weight change     Gastrointestinal: Negative for abdominal distention, abdominal pain, constipation, diarrhea, nausea and vomiting  Genitourinary: Negative for difficulty urinating, dysuria, frequency, genital sores, hematuria, menstrual problem, pelvic pain, urgency, vaginal bleeding, vaginal discharge and vaginal pain  Objective:      /64 (BP Location: Left arm, Patient Position: Sitting, Cuff Size: Standard)   Ht 5' 8" (1 727 m)   Wt 56 kg (123 lb 6 4 oz)   LMP 04/20/2022   BMI 18 76 kg/m²          Physical Exam  Vitals reviewed  Constitutional:       Appearance: Normal appearance  She is well-developed and normal weight  Neurological:      Mental Status: She is alert and oriented to person, place, and time  Psychiatric:         Mood and Affect: Mood normal          Behavior: Behavior normal  Behavior is cooperative  Thought Content:  Thought content normal          Judgment: Judgment normal

## 2022-05-06 NOTE — PATIENT INSTRUCTIONS
Rx for Depo Provera x 1 refill sent to pharmacy  Practice consistent condom use for STD and pregnancy prevention      F/u for Depo Provera dose and yearly gyn exam

## 2022-05-09 LAB
C TRACH DNA SPEC QL NAA+PROBE: NEGATIVE
N GONORRHOEA DNA SPEC QL NAA+PROBE: NEGATIVE

## 2022-05-19 ENCOUNTER — CLINICAL SUPPORT (OUTPATIENT)
Dept: OBGYN CLINIC | Facility: CLINIC | Age: 22
End: 2022-05-19
Payer: MEDICARE

## 2022-05-19 VITALS
WEIGHT: 122.4 LBS | BODY MASS INDEX: 18.55 KG/M2 | HEIGHT: 68 IN | DIASTOLIC BLOOD PRESSURE: 68 MMHG | SYSTOLIC BLOOD PRESSURE: 110 MMHG

## 2022-05-19 DIAGNOSIS — Z30.42 ENCOUNTER FOR SURVEILLANCE OF INJECTABLE CONTRACEPTIVE: Primary | ICD-10-CM

## 2022-05-19 PROCEDURE — 96372 THER/PROPH/DIAG INJ SC/IM: CPT | Performed by: OBSTETRICS & GYNECOLOGY

## 2022-05-19 RX ORDER — MEDROXYPROGESTERONE ACETATE 150 MG/ML
150 INJECTION, SUSPENSION INTRAMUSCULAR ONCE
Status: COMPLETED | OUTPATIENT
Start: 2022-05-19 | End: 2022-05-19

## 2022-05-19 RX ADMIN — MEDROXYPROGESTERONE ACETATE 150 MG: 150 INJECTION, SUSPENSION INTRAMUSCULAR at 08:33

## 2022-07-21 ENCOUNTER — APPOINTMENT (EMERGENCY)
Dept: CT IMAGING | Facility: HOSPITAL | Age: 22
End: 2022-07-21
Payer: MEDICARE

## 2022-07-21 ENCOUNTER — TELEMEDICINE (OUTPATIENT)
Dept: FAMILY MEDICINE CLINIC | Facility: CLINIC | Age: 22
End: 2022-07-21
Payer: MEDICARE

## 2022-07-21 ENCOUNTER — HOSPITAL ENCOUNTER (EMERGENCY)
Facility: HOSPITAL | Age: 22
Discharge: HOME | End: 2022-07-21
Attending: EMERGENCY MEDICINE
Payer: MEDICARE

## 2022-07-21 VITALS — BODY MASS INDEX: 19.3 KG/M2 | WEIGHT: 123 LBS | HEIGHT: 67 IN

## 2022-07-21 VITALS
TEMPERATURE: 100.2 F | DIASTOLIC BLOOD PRESSURE: 56 MMHG | HEART RATE: 90 BPM | RESPIRATION RATE: 14 BRPM | SYSTOLIC BLOOD PRESSURE: 99 MMHG | OXYGEN SATURATION: 98 %

## 2022-07-21 DIAGNOSIS — N93.8 DUB (DYSFUNCTIONAL UTERINE BLEEDING): Primary | ICD-10-CM

## 2022-07-21 DIAGNOSIS — R23.1 PALLOR: ICD-10-CM

## 2022-07-21 DIAGNOSIS — B34.9 ACUTE VIRAL SYNDROME: Primary | ICD-10-CM

## 2022-07-21 DIAGNOSIS — N92.1 BREAKTHROUGH BLEEDING ON DEPO PROVERA: ICD-10-CM

## 2022-07-21 LAB
ALBUMIN SERPL BCP-MCNC: 4.6 G/DL (ref 3.5–5)
ALP SERPL-CCNC: 61 U/L (ref 34–104)
ALT SERPL W P-5'-P-CCNC: 13 U/L (ref 7–52)
ANION GAP SERPL CALCULATED.3IONS-SCNC: 10 MMOL/L (ref 4–13)
AST SERPL W P-5'-P-CCNC: 21 U/L (ref 13–39)
BASOPHILS # BLD AUTO: 0.02 THOUSANDS/ΜL (ref 0–0.1)
BASOPHILS NFR BLD AUTO: 1 % (ref 0–1)
BILIRUB SERPL-MCNC: 0.43 MG/DL (ref 0.2–1)
BILIRUB UR QL STRIP: ABNORMAL
BUN SERPL-MCNC: 12 MG/DL (ref 5–25)
CALCIUM SERPL-MCNC: 9.4 MG/DL (ref 8.4–10.2)
CHLORIDE SERPL-SCNC: 101 MMOL/L (ref 96–108)
CLARITY UR: CLEAR
CO2 SERPL-SCNC: 24 MMOL/L (ref 21–32)
COLOR UR: YELLOW
CREAT SERPL-MCNC: 0.81 MG/DL (ref 0.6–1.3)
EOSINOPHIL # BLD AUTO: 0 THOUSAND/ΜL (ref 0–0.61)
EOSINOPHIL NFR BLD AUTO: 0 % (ref 0–6)
ERYTHROCYTE [DISTWIDTH] IN BLOOD BY AUTOMATED COUNT: 12.2 % (ref 11.6–15.1)
EXT PREG TEST URINE: NEGATIVE
EXT. CONTROL ED NAV: NORMAL
FLUAV RNA RESP QL NAA+PROBE: NEGATIVE
FLUBV RNA RESP QL NAA+PROBE: NEGATIVE
GFR SERPL CREATININE-BSD FRML MDRD: 103 ML/MIN/1.73SQ M
GLUCOSE SERPL-MCNC: 107 MG/DL (ref 65–140)
GLUCOSE UR STRIP-MCNC: NEGATIVE MG/DL
HCT VFR BLD AUTO: 41.9 % (ref 34.8–46.1)
HGB BLD-MCNC: 14 G/DL (ref 11.5–15.4)
HGB UR QL STRIP.AUTO: NEGATIVE
HOLD SPECIMEN: NORMAL
IMM GRANULOCYTES # BLD AUTO: 0.02 THOUSAND/UL (ref 0–0.2)
IMM GRANULOCYTES NFR BLD AUTO: 1 % (ref 0–2)
KETONES UR STRIP-MCNC: ABNORMAL MG/DL
LACTATE SERPL-SCNC: 1 MMOL/L (ref 0.5–2)
LEUKOCYTE ESTERASE UR QL STRIP: NEGATIVE
LIPASE SERPL-CCNC: 31 U/L (ref 11–82)
LYMPHOCYTES # BLD AUTO: 0.59 THOUSANDS/ΜL (ref 0.6–4.47)
LYMPHOCYTES NFR BLD AUTO: 15 % (ref 14–44)
MCH RBC QN AUTO: 29.4 PG (ref 26.8–34.3)
MCHC RBC AUTO-ENTMCNC: 33.4 G/DL (ref 31.4–37.4)
MCV RBC AUTO: 88 FL (ref 82–98)
MONOCYTES # BLD AUTO: 0.26 THOUSAND/ΜL (ref 0.17–1.22)
MONOCYTES NFR BLD AUTO: 7 % (ref 4–12)
NEUTROPHILS # BLD AUTO: 3.05 THOUSANDS/ΜL (ref 1.85–7.62)
NEUTS SEG NFR BLD AUTO: 76 % (ref 43–75)
NITRITE UR QL STRIP: NEGATIVE
NRBC BLD AUTO-RTO: 0 /100 WBCS
PH UR STRIP.AUTO: 5.5 [PH] (ref 4.5–8)
PLATELET # BLD AUTO: 166 THOUSANDS/UL (ref 149–390)
PMV BLD AUTO: 11.1 FL (ref 8.9–12.7)
POTASSIUM SERPL-SCNC: 3.7 MMOL/L (ref 3.5–5.3)
PROT SERPL-MCNC: 7.6 G/DL (ref 6.4–8.4)
PROT UR STRIP-MCNC: NEGATIVE MG/DL
RBC # BLD AUTO: 4.77 MILLION/UL (ref 3.81–5.12)
RSV RNA RESP QL NAA+PROBE: NEGATIVE
SARS-COV-2 RNA RESP QL NAA+PROBE: NEGATIVE
SODIUM SERPL-SCNC: 135 MMOL/L (ref 135–147)
SP GR UR STRIP.AUTO: >=1.03 (ref 1–1.03)
UROBILINOGEN UR QL STRIP.AUTO: 0.2 E.U./DL
WBC # BLD AUTO: 3.94 THOUSAND/UL (ref 4.31–10.16)

## 2022-07-21 PROCEDURE — 96375 TX/PRO/DX INJ NEW DRUG ADDON: CPT

## 2022-07-21 PROCEDURE — 0241U HB NFCT DS VIR RESP RNA 4 TRGT: CPT | Performed by: EMERGENCY MEDICINE

## 2022-07-21 PROCEDURE — 99284 EMERGENCY DEPT VISIT MOD MDM: CPT | Performed by: EMERGENCY MEDICINE

## 2022-07-21 PROCEDURE — 74177 CT ABD & PELVIS W/CONTRAST: CPT

## 2022-07-21 PROCEDURE — 83605 ASSAY OF LACTIC ACID: CPT | Performed by: EMERGENCY MEDICINE

## 2022-07-21 PROCEDURE — 36415 COLL VENOUS BLD VENIPUNCTURE: CPT

## 2022-07-21 PROCEDURE — 81003 URINALYSIS AUTO W/O SCOPE: CPT

## 2022-07-21 PROCEDURE — 80053 COMPREHEN METABOLIC PANEL: CPT | Performed by: EMERGENCY MEDICINE

## 2022-07-21 PROCEDURE — 83690 ASSAY OF LIPASE: CPT | Performed by: EMERGENCY MEDICINE

## 2022-07-21 PROCEDURE — 96361 HYDRATE IV INFUSION ADD-ON: CPT

## 2022-07-21 PROCEDURE — 81025 URINE PREGNANCY TEST: CPT | Performed by: EMERGENCY MEDICINE

## 2022-07-21 PROCEDURE — 85025 COMPLETE CBC W/AUTO DIFF WBC: CPT | Performed by: EMERGENCY MEDICINE

## 2022-07-21 PROCEDURE — 99284 EMERGENCY DEPT VISIT MOD MDM: CPT

## 2022-07-21 PROCEDURE — 96365 THER/PROPH/DIAG IV INF INIT: CPT

## 2022-07-21 PROCEDURE — 93005 ELECTROCARDIOGRAM TRACING: CPT

## 2022-07-21 PROCEDURE — 99214 OFFICE O/P EST MOD 30 MIN: CPT | Performed by: FAMILY MEDICINE

## 2022-07-21 PROCEDURE — G1004 CDSM NDSC: HCPCS

## 2022-07-21 RX ORDER — ACETAMINOPHEN 325 MG/1
650 TABLET ORAL ONCE
Status: COMPLETED | OUTPATIENT
Start: 2022-07-21 | End: 2022-07-21

## 2022-07-21 RX ORDER — METOCLOPRAMIDE HYDROCHLORIDE 5 MG/ML
10 INJECTION INTRAMUSCULAR; INTRAVENOUS ONCE
Status: COMPLETED | OUTPATIENT
Start: 2022-07-21 | End: 2022-07-21

## 2022-07-21 RX ORDER — DIPHENHYDRAMINE HYDROCHLORIDE 50 MG/ML
12.5 INJECTION INTRAMUSCULAR; INTRAVENOUS ONCE
Status: COMPLETED | OUTPATIENT
Start: 2022-07-21 | End: 2022-07-21

## 2022-07-21 RX ORDER — KETOROLAC TROMETHAMINE 30 MG/ML
15 INJECTION, SOLUTION INTRAMUSCULAR; INTRAVENOUS ONCE
Status: COMPLETED | OUTPATIENT
Start: 2022-07-21 | End: 2022-07-21

## 2022-07-21 RX ORDER — NORETHINDRONE ACETATE AND ETHINYL ESTRADIOL 1MG-20(21)
1 KIT ORAL DAILY
Qty: 84 TABLET | Refills: 3 | Status: SHIPPED | OUTPATIENT
Start: 2022-07-21 | End: 2022-07-27

## 2022-07-21 RX ADMIN — METOCLOPRAMIDE 10 MG: 5 INJECTION, SOLUTION INTRAMUSCULAR; INTRAVENOUS at 20:32

## 2022-07-21 RX ADMIN — DIPHENHYDRAMINE HYDROCHLORIDE 12.5 MG: 50 INJECTION, SOLUTION INTRAMUSCULAR; INTRAVENOUS at 20:31

## 2022-07-21 RX ADMIN — SODIUM CHLORIDE, SODIUM LACTATE, POTASSIUM CHLORIDE, AND CALCIUM CHLORIDE 1000 ML: .6; .31; .03; .02 INJECTION, SOLUTION INTRAVENOUS at 20:37

## 2022-07-21 RX ADMIN — KETOROLAC TROMETHAMINE 15 MG: 30 INJECTION, SOLUTION INTRAMUSCULAR; INTRAVENOUS at 19:23

## 2022-07-21 RX ADMIN — SODIUM CHLORIDE 1000 ML: 0.9 INJECTION, SOLUTION INTRAVENOUS at 18:46

## 2022-07-21 RX ADMIN — ACETAMINOPHEN 650 MG: 325 TABLET ORAL at 20:30

## 2022-07-21 RX ADMIN — IOHEXOL 70 ML: 350 INJECTION, SOLUTION INTRAVENOUS at 20:20

## 2022-07-21 NOTE — Clinical Note
Sherren Homans was seen and treated in our emergency department on 7/21/2022  No restrictions            Diagnosis:     Sharyn Alex  may return to school on return date, may return to work on return date  She may return on this date: 07/25/2022         If you have any questions or concerns, please don't hesitate to call        Wilbert Aguayo MD    ______________________________           _______________          _______________  Hospital Representative                              Date                                Time

## 2022-07-21 NOTE — PROGRESS NOTES
Virtual Regular Visit    Verification of patient location:    Patient is located in the following state in which I hold an active license [place]      Assessment/Plan:    Problem List Items Addressed This Visit    None     Visit Diagnoses     DUB (dysfunctional uterine bleeding)    -  Primary    Relevant Medications    norethindrone-ethinyl estradiol (Junel FE 1/20) 1-20 MG-MCG per tablet    Other Relevant Orders    CBC and differential    Breakthrough bleeding on depo provera        [place]            I have discussed the case with Dr Gin Bush  Patient does need estrogen support  Will start oral contraceptive pill for 1 month  She will follow-up with OBGYN  I have advised her to do CBC  Have given ER precautions  She may be severely anemic  She will take iron supplements  Depression Screening and Follow-up Plan: Patient was screened for depression during today's encounter  They screened negative with a PHQ-2 score of 1  Reason for visit is   Chief Complaint   Patient presents with    Headache    Generalized Body Aches    Vaginal Bleeding     For a month after getting depo shot    Virtual Regular Visit        Encounter provider True Russo MD    Provider located at [address] [place] [id number] [phone]      Recent Visits  No visits were found meeting these conditions  Showing recent visits within past 7 days and meeting all other requirements  Today's Visits  Date Type Provider Dept   07/21/22 Telemedicine True Russo MD  [address] today's visits and meeting all other requirements  Future Appointments  No visits were found meeting these conditions  Showing future appointments within next 150 days and meeting all other requirements       The patient was identified by name and date of birth   Ely Valenzuela was informed that this is a telemedicine visit and that the visit is being conducted through Epic Embedded and patient was informed this is a secure, HIPAA-complaint platform  She agrees to proceed     My office door was closed  No one else was in the room  She acknowledged consent and understanding of privacy and security of the video platform  The patient has agreed to participate and understands they can discontinue the visit at any time  Patient is aware this is a billable service  Peri Issa is a 24 y o  female   Was given a Depo injection in May 2022 and since June 06/20/2022 she has had ongoing bleeding with heavy clots during the daytime and lighter periods during the nighttime  This has been almost going on for over 3-4 weeks now  Patient has not been experiencing headache and fatigue and chills  She denies any viral symptoms       Past Medical History:   Diagnosis Date    Anemia     Depression     Miscarriage     Polycystic ovary syndrome        Past Surgical History:   Procedure Laterality Date    NO PAST SURGERIES         Current Outpatient Medications   Medication Sig Dispense Refill    albuterol (Ventolin HFA) 90 mcg/act inhaler Inhale 2 puffs every 6 (six) hours as needed for wheezing 18 g 5    medroxyPROGESTERone (DEPO-PROVERA) 150 mg/mL injection Inject 1 mL (150 mg total) into a muscle every 3 (three) months 1 mL 1    norethindrone-ethinyl estradiol (Junel FE 1/20) 1-20 MG-MCG per tablet Take 1 tablet by mouth daily 84 tablet 3     No current facility-administered medications for this visit  No Known Allergies    Review of Systems   Constitutional: Positive for chills and fatigue  Negative for fever  HENT: Negative for congestion, facial swelling, mouth sores, rhinorrhea, sore throat and trouble swallowing  Eyes: Negative for pain and redness  Respiratory: Negative for cough, shortness of breath and wheezing  Cardiovascular: Negative for chest pain, palpitations and leg swelling     Gastrointestinal: Negative for abdominal pain, blood in stool, constipation, diarrhea and nausea  Genitourinary: Positive for vaginal bleeding  Negative for dysuria, hematuria and urgency  Musculoskeletal: Negative for arthralgias, back pain and myalgias  Skin: Negative for rash and wound  Neurological: Positive for headaches  Negative for seizures and syncope  Hematological: Negative for adenopathy  Psychiatric/Behavioral: Negative for agitation and behavioral problems  Video Exam    Vitals:    07/21/22 1439   Weight: 55 8 kg (123 lb)   Height: 5' 7" (1 702 m)       Physical Exam  Vitals and nursing note reviewed  Constitutional:       Appearance: She is well-developed  HENT:      Head: Normocephalic and atraumatic  Right Ear: External ear normal       Left Ear: External ear normal       Nose: Nose normal    Eyes:      General: No scleral icterus  Right eye: No discharge  Left eye: No discharge  Conjunctiva/sclera: Conjunctivae normal       Comments: Visual observation   Pulmonary:      Effort: No respiratory distress  Abdominal:      Palpations: Abdomen is soft  Tenderness: There is no abdominal tenderness  Comments: Patient self-examined   Musculoskeletal:         General: No tenderness or deformity  Skin:     Coloration: Skin is pale  Findings: No rash  Neurological:      Mental Status: She is alert  Mental status is at baseline  Psychiatric:         Behavior: Behavior normal          Judgment: Judgment normal             VIRTUAL VISIT DISCLAIMER      Marilynn Garcia verbally agrees to participate in [employer]  Pt is aware that [employer] could be limited without vital signs or the ability to perform a full hands-on physical Foster Conway understands she or the provider may request at any time to terminate the video visit and request the patient to seek care or treatment in person

## 2022-07-22 LAB
ATRIAL RATE: 124 BPM
P AXIS: 74 DEGREES
PR INTERVAL: 124 MS
QRS AXIS: 103 DEGREES
QRSD INTERVAL: 80 MS
QT INTERVAL: 296 MS
QTC INTERVAL: 417 MS
T WAVE AXIS: 67 DEGREES
VENTRICULAR RATE: 119 BPM

## 2022-07-22 PROCEDURE — 93010 ELECTROCARDIOGRAM REPORT: CPT | Performed by: INTERNAL MEDICINE

## 2022-07-27 ENCOUNTER — ANNUAL EXAM (OUTPATIENT)
Dept: OBGYN CLINIC | Facility: CLINIC | Age: 22
End: 2022-07-27
Payer: MEDICARE

## 2022-07-27 VITALS
BODY MASS INDEX: 19.24 KG/M2 | SYSTOLIC BLOOD PRESSURE: 106 MMHG | WEIGHT: 122.6 LBS | DIASTOLIC BLOOD PRESSURE: 60 MMHG | HEIGHT: 67 IN

## 2022-07-27 DIAGNOSIS — Z12.4 ENCOUNTER FOR PAPANICOLAOU SMEAR FOR CERVICAL CANCER SCREENING: ICD-10-CM

## 2022-07-27 DIAGNOSIS — Z01.419 ENCOUNTER FOR GYNECOLOGICAL EXAMINATION WITHOUT ABNORMAL FINDING: ICD-10-CM

## 2022-07-27 DIAGNOSIS — N92.1 BREAKTHROUGH BLEEDING ON DEPO PROVERA: Primary | ICD-10-CM

## 2022-07-27 PROBLEM — F41.9 ANXIETY DISORDER: Status: RESOLVED | Noted: 2017-03-24 | Resolved: 2022-07-27

## 2022-07-27 PROBLEM — F41.9 ANXIETY DISORDER: Status: ACTIVE | Noted: 2017-03-24

## 2022-07-27 LAB — SL AMB POCT URINE HCG: NEGATIVE

## 2022-07-27 PROCEDURE — 81025 URINE PREGNANCY TEST: CPT | Performed by: PHYSICIAN ASSISTANT

## 2022-07-27 PROCEDURE — 99395 PREV VISIT EST AGE 18-39: CPT | Performed by: PHYSICIAN ASSISTANT

## 2022-07-27 PROCEDURE — G0145 SCR C/V CYTO,THINLAYER,RESCR: HCPCS | Performed by: PHYSICIAN ASSISTANT
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
## 2022-08-03 LAB
LAB AP GYN PRIMARY INTERPRETATION: NORMAL
Lab: NORMAL

## 2022-08-08 DIAGNOSIS — R94.31 ABNORMAL EKG: Primary | ICD-10-CM

## 2022-09-29 ENCOUNTER — OFFICE VISIT (OUTPATIENT)
Dept: CARDIOLOGY CLINIC | Facility: CLINIC | Age: 22
End: 2022-09-29
Payer: MEDICARE

## 2022-09-29 VITALS
BODY MASS INDEX: 19.37 KG/M2 | WEIGHT: 123.44 LBS | HEART RATE: 100 BPM | RESPIRATION RATE: 18 BRPM | SYSTOLIC BLOOD PRESSURE: 100 MMHG | OXYGEN SATURATION: 98 % | DIASTOLIC BLOOD PRESSURE: 72 MMHG | HEIGHT: 67 IN

## 2022-09-29 DIAGNOSIS — R55 PRE-SYNCOPE: Primary | ICD-10-CM

## 2022-09-29 DIAGNOSIS — R94.31 ABNORMAL EKG: ICD-10-CM

## 2022-09-29 PROCEDURE — 93000 ELECTROCARDIOGRAM COMPLETE: CPT | Performed by: INTERNAL MEDICINE

## 2022-09-29 PROCEDURE — 99203 OFFICE O/P NEW LOW 30 MIN: CPT | Performed by: INTERNAL MEDICINE

## 2022-09-29 NOTE — PROGRESS NOTES
Cardiology Consultation     Glenn Pitt  [id number]  2000  [facility] [place]  [address]  [facility] [address]      Diagnoses and all orders for this visit:    Abnormal EKG  -     Ambulatory Referral to Cardiology  -     POCT ECG      I had the pleasure of seeing Glenn Pitt for a consultation regarding an abnormal ECG requested by Dr Emmanuel MD    History of the Presenting Illness, Discussion/Summary and my Plan are as follows:::    Marsha Ni is a pleasant 39-year-old without hypertension, diabetes, no known dyslipidemia although I do not see a lipid profile, no tobacco use, currently vapes, very occasional alcohol use and no drug use  During her presentations to the ED over the last year, she has been hypotensive but was normotensive during her visit with her OBGYN-May 2022  Her parents themselves are healthy in the 45s, grandmother had stents and grandfather had a heart attack  Her brother has dextrocardia and transposition of the great arteries, he is currently 12  Paytonherself has never been screened for congenital heart disease  She went to the ED with fever and pleuritic chest discomfort in July 2022, leading to an ECG that showed sinus tachycardia and biatrial enlargement, RSR pattern but with a normal QRS duration of 80 milliseconds  This prompted further evaluation leading to her visit here  She does not have any cardiac symptoms, 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, walks a good amount without any symptoms      She has had fainting spells-sound vasovagal, associated with dizziness, sweating, nausea, occurring about once an year, that usually can be prevented if she sits down once she starts feeling dizziness  No preceding palpitations  She drinks about 3-4 bottles of water a day  Does not drink any coffee since it gives her palpitations      Cardiac exam is unremarkable, normal S2, normal splitting, no significant murmurs  Plan:    ECG showing right atrial enlargement today, biatrial enlargement on a recent ECG with RSR pattern:  Likely a normal variant based on unremarkable exam but for completeness will check an echocardiogram to rule out PFO/ASD  Her cardiac silhouette was normal based on x-ray in 2012 and based on imaging of the lower chest on an abdomen/pelvis CT recently  Prior episodes of vasovagal presyncope/syncope:  Classic vasovagal episodes, await echo, adequate hydration was advised, no clear triggers  Counter pressure maneuvers  If her echo is unremarkable and she remains asymptomatic, she can follow up as needed  1  Abnormal EKG  Ambulatory Referral to Cardiology    POCT ECG     Patient Active Problem List   Diagnosis   (none) - all problems resolved or deleted     Past Medical History:   Diagnosis Date    ADHD, predominantly inattentive type 7/15/2016    Anemia     Anxiety disorder 3/24/2017    Depression     Miscarriage     Polycystic ovary syndrome      Social History     Socioeconomic History    Marital status: Single     Spouse name: Not on file    Number of children: 1    Years of education: 6    Highest education level: High school graduate   Occupational History    Occupation: CNA   Tobacco Use    Smoking status: Former Smoker     Packs/day: 0 00     Years: 2 00     Pack years: 0 00     Types: Cigarettes    Smokeless tobacco: Former User    Tobacco comment: Quit    hyears ago    Vaping Use    Vaping Use: Every day    Substances: Nicotine, Flavoring   Substance and Sexual Activity    Alcohol use:  Yes     Alcohol/week: 0 0 standard drinks     Comment: occ    Drug use: Never     Comment: none    Sexual activity: Yes     Partners: Male     Birth control/protection: None   Other Topics Concern    Not on file   Social History Narrative    Most recent tobacco use screenin2019    Do you currently or have you served in the  [employer]: No    Were you activated, into active duty, as a member of the [employer] or as a Reservist: No    Education: 11    General stress level: Low    Exercise level: Occasional    Diet: Regular    Marital status: Single    Sexual orientation: Heterosexual    Alcohol intake: None    Live alone or with others: with others    Caffeine intake: Occasional    Illicit drugs: denies    Guns present in home: No    Sexually active: Yes    Seat belts used routinely: Yes    Sunscreen used routinely: No    Do you feel safe at home: Yes     Social Determinants of Health     Financial Resource Strain: Not on file   Food Insecurity: Not on file   Transportation Needs: Not on file   Physical Activity: Not on file   Stress: Not on file   Social Connections: Not on file   Intimate Partner Violence: Not on file   Housing Stability: Not on file      Family History   Problem Relation Age of Onset    Diabetes Mother     Cervical cancer Mother     Heart disease Brother     No Known Problems Paternal Grandmother     No Known Problems Father     No Known Problems Sister     No Known Problems Daughter     Heart attack Maternal Grandmother     Heart attack Maternal Grandfather     No Known Problems Paternal Grandfather     No Known Problems Sister     No Known Problems Brother     No Known Problems Brother      Past Surgical History:   Procedure Laterality Date    NO PAST SURGERIES         Current Outpatient Medications:     albuterol (Ventolin HFA) 90 mcg/act inhaler, Inhale 2 puffs every 6 (six) hours as needed for wheezing, Disp: 18 g, Rfl: 5    medroxyPROGESTERone (DEPO-PROVERA) 150 mg/mL injection, Inject 1 mL (150 mg total) into a muscle every 3 (three) months (Patient not taking: Reported on 9/29/2022), Disp: 1 mL, Rfl: 1  No Known Allergies  Vitals:    09/29/22 1124   BP: 100/72   BP Location: Right arm   Patient Position: Sitting   Cuff Size: Standard   Pulse: 100   Resp: 18   SpO2: 98%   Weight: 56 kg (123 lb 7 oz)   Height: 5' 7" (1 702 m)         Imaging: No results found  Review of Systems:  Review of Systems   Constitutional: Negative  HENT: Negative  Eyes: Negative  Respiratory: Negative  Cardiovascular: Negative  Musculoskeletal: Negative  Allergic/Immunologic: Negative  Physical Exam:    /72 (BP Location: Right arm, Patient Position: Sitting, Cuff Size: Standard)   Pulse 100   Resp 18   Ht 5' 7" (1 702 m)   Wt 56 kg (123 lb 7 oz)   SpO2 98%   BMI 19 33 kg/m²   Physical Exam  Constitutional:       General: She is not in acute distress  Appearance: Normal appearance  She is not ill-appearing  HENT:      Head: Normocephalic  Nose: Nose normal  No congestion or rhinorrhea  Neck:      Vascular: No carotid bruit  Cardiovascular:      Rate and Rhythm: Normal rate  Heart sounds: No murmur heard  No friction rub  No gallop  Pulmonary:      Effort: Pulmonary effort is normal  No respiratory distress  Breath sounds: No stridor  No wheezing or rhonchi  Musculoskeletal:      Cervical back: Normal range of motion  No rigidity or tenderness  Lymphadenopathy:      Cervical: No cervical adenopathy  Skin:     Coloration: Skin is not jaundiced or pale  Findings: No bruising or erythema  Neurological:      Mental Status: She is alert  This note was completed in part utilizing [email or web address] direct voice recognition software  Grammatical errors, random word insertion, spelling mistakes, occasional wrong word or "sound-alike" substitutions and incomplete sentences may be an occasional consequence of the system secondary to software limitations, ambient noise and hardware issues  At the time of dictation, efforts were made to edit, clarify and /or correct errors  Please read the chart carefully and recognize, using context, where substitutions have occurred    If you have any questions or concerns about the context, text or information contained within the body of this dictation, please contact myself, the provider, for further clarification

## 2022-11-02 ENCOUNTER — HOSPITAL ENCOUNTER (OUTPATIENT)
Dept: NON INVASIVE DIAGNOSTICS | Facility: CLINIC | Age: 22
Discharge: HOME | End: 2022-11-02

## 2022-11-02 VITALS
DIASTOLIC BLOOD PRESSURE: 72 MMHG | HEIGHT: 67 IN | HEART RATE: 74 BPM | WEIGHT: 123 LBS | SYSTOLIC BLOOD PRESSURE: 100 MMHG | BODY MASS INDEX: 19.3 KG/M2

## 2022-11-02 DIAGNOSIS — R94.31 ABNORMAL EKG: ICD-10-CM

## 2022-11-02 LAB
AORTIC ROOT: 1.9 CM
APICAL FOUR CHAMBER EJECTION FRACTION: 64 %
ASCENDING AORTA: 2.4 CM
E WAVE DECELERATION TIME: 124 MS
FRACTIONAL SHORTENING: 31 % (ref 28–44)
INTERVENTRICULAR SEPTUM IN DIASTOLE (PARASTERNAL SHORT AXIS VIEW): 0.6 CM
INTERVENTRICULAR SEPTUM: 0.6 CM (ref 0.6–1.1)
LAAS-AP2: 8.5 CM2
LAAS-AP4: 10.1 CM2
LEFT ATRIUM AREA SYSTOLE SINGLE PLANE A4C: 11.2 CM2
LEFT ATRIUM SIZE: 2.7 CM
LEFT INTERNAL DIMENSION IN SYSTOLE: 2.5 CM (ref 2.1–4)
LEFT VENTRICULAR INTERNAL DIMENSION IN DIASTOLE: 3.6 CM (ref 3.5–6)
LEFT VENTRICULAR POSTERIOR WALL IN END DIASTOLE: 0.6 CM
LEFT VENTRICULAR STROKE VOLUME: 34 ML
LVSV (TEICH): 34 ML
MV E'TISSUE VEL-SEP: 11 CM/S
MV PEAK A VEL: 0.59 M/S
MV PEAK E VEL: 65 CM/S
MV STENOSIS PRESSURE HALF TIME: 36 MS
MV VALVE AREA P 1/2 METHOD: 6.11 CM2
RIGHT ATRIUM AREA SYSTOLE A4C: 8.3 CM2
RIGHT VENTRICLE ID DIMENSION: 2.6 CM
SL CV LEFT ATRIUM LENGTH A2C: 3.6 CM
SL CV LV EF: 60
SL CV PED ECHO LEFT VENTRICLE DIASTOLIC VOLUME (MOD BIPLANE) 2D: 56 ML
SL CV PED ECHO LEFT VENTRICLE SYSTOLIC VOLUME (MOD BIPLANE) 2D: 22 ML

## 2023-05-16 ENCOUNTER — APPOINTMENT (OUTPATIENT)
Dept: LAB | Facility: CLINIC | Age: 23
End: 2023-05-16

## 2023-05-16 ENCOUNTER — INITIAL PRENATAL (OUTPATIENT)
Dept: OBGYN CLINIC | Facility: CLINIC | Age: 23
End: 2023-05-16

## 2023-05-16 VITALS
DIASTOLIC BLOOD PRESSURE: 68 MMHG | SYSTOLIC BLOOD PRESSURE: 102 MMHG | BODY MASS INDEX: 18.99 KG/M2 | WEIGHT: 121 LBS | HEIGHT: 67 IN

## 2023-05-16 DIAGNOSIS — Z34.90 EARLY STAGE OF PREGNANCY: Primary | ICD-10-CM

## 2023-05-16 DIAGNOSIS — Z34.90 EARLY STAGE OF PREGNANCY: ICD-10-CM

## 2023-05-16 LAB
ABO GROUP BLD: NORMAL
B-HCG SERPL-ACNC: ABNORMAL MIU/ML (ref 0–5)
BASOPHILS # BLD AUTO: 0.05 THOUSANDS/ÂΜL (ref 0–0.1)
BASOPHILS NFR BLD AUTO: 1 % (ref 0–1)
BLD GP AB SCN SERPL QL: NEGATIVE
EOSINOPHIL # BLD AUTO: 0.14 THOUSAND/ÂΜL (ref 0–0.61)
EOSINOPHIL NFR BLD AUTO: 2 % (ref 0–6)
ERYTHROCYTE [DISTWIDTH] IN BLOOD BY AUTOMATED COUNT: 12 % (ref 11.6–15.1)
HCT VFR BLD AUTO: 37.5 % (ref 34.8–46.1)
HGB BLD-MCNC: 12.6 G/DL (ref 11.5–15.4)
IMM GRANULOCYTES # BLD AUTO: 0.04 THOUSAND/UL (ref 0–0.2)
IMM GRANULOCYTES NFR BLD AUTO: 1 % (ref 0–2)
LYMPHOCYTES # BLD AUTO: 2.36 THOUSANDS/ÂΜL (ref 0.6–4.47)
LYMPHOCYTES NFR BLD AUTO: 31 % (ref 14–44)
MCH RBC QN AUTO: 30.1 PG (ref 26.8–34.3)
MCHC RBC AUTO-ENTMCNC: 33.6 G/DL (ref 31.4–37.4)
MCV RBC AUTO: 90 FL (ref 82–98)
MONOCYTES # BLD AUTO: 0.56 THOUSAND/ÂΜL (ref 0.17–1.22)
MONOCYTES NFR BLD AUTO: 7 % (ref 4–12)
NEUTROPHILS # BLD AUTO: 4.55 THOUSANDS/ÂΜL (ref 1.85–7.62)
NEUTS SEG NFR BLD AUTO: 58 % (ref 43–75)
NRBC BLD AUTO-RTO: 0 /100 WBCS
PLATELET # BLD AUTO: 214 THOUSANDS/UL (ref 149–390)
PMV BLD AUTO: 10.7 FL (ref 8.9–12.7)
PROGEST SERPL-MCNC: 13.49 NG/ML
RBC # BLD AUTO: 4.19 MILLION/UL (ref 3.81–5.12)
RH BLD: POSITIVE
SPECIMEN EXPIRATION DATE: NORMAL
WBC # BLD AUTO: 7.7 THOUSAND/UL (ref 4.31–10.16)

## 2023-05-16 NOTE — PROGRESS NOTES
Pt presents for initial OB appointment  US done in office today: TVUS done in office  +GS noted, + YS, although no embryo noted in the GS  GS measuring c/w 6w3d  Pt notes that her periods are very regular  She took a + UPT 23  We reviewed possibly off dates vs failed early IUP  Will plan quant x 2, prog and then f/u as needed  Will plan US repeat in 1 week pending lab work results  PMHX:depression and anxiety,  no meds currently well controlled, PCOS    POBHX: 1 , 1 early SAB     PSURGHX:denies       All questions answered  Ultrasound Probe Disinfection    A transvaginal ultrasound was performed     Probe identification: probe serial number [employer]: [id number]  Disinfection process: Disinfection was performed with High Level Disinfection Process (Trophon)    Catie Delcid PA-C

## 2023-05-18 ENCOUNTER — APPOINTMENT (OUTPATIENT)
Dept: LAB | Facility: CLINIC | Age: 23
End: 2023-05-18

## 2023-05-18 DIAGNOSIS — Z34.90 EARLY STAGE OF PREGNANCY: ICD-10-CM

## 2023-05-18 LAB
B-HCG SERPL-ACNC: ABNORMAL MIU/ML (ref 0–5)
PROGEST SERPL-MCNC: 14.56 NG/ML

## 2023-05-19 ENCOUNTER — TELEPHONE (OUTPATIENT)
Dept: OBGYN CLINIC | Facility: CLINIC | Age: 23
End: 2023-05-19

## 2023-05-19 NOTE — TELEPHONE ENCOUNTER
Reviewed results with pt  Pt made aware reviewed with on call provider and recommendations are for repeat US 7-10 days from last [address] on 5/16 in office  Pt verbalizes understanding, aware of precautions as reviewed, and aware no availability in office at this time but will continue to monitor schedule and will review with provider on Monday as well, and will be in touch with pt

## 2023-05-24 ENCOUNTER — APPOINTMENT (OUTPATIENT)
Dept: LAB | Facility: CLINIC | Age: 23
End: 2023-05-24

## 2023-05-24 ENCOUNTER — ULTRASOUND (OUTPATIENT)
Dept: OBGYN CLINIC | Facility: CLINIC | Age: 23
End: 2023-05-24

## 2023-05-24 VITALS
BODY MASS INDEX: 19.09 KG/M2 | DIASTOLIC BLOOD PRESSURE: 68 MMHG | HEIGHT: 67 IN | SYSTOLIC BLOOD PRESSURE: 106 MMHG | WEIGHT: 121.6 LBS

## 2023-05-24 DIAGNOSIS — O20.0 THREATENED ABORTION: Primary | ICD-10-CM

## 2023-05-24 DIAGNOSIS — O20.0 THREATENED ABORTION: ICD-10-CM

## 2023-05-24 LAB — B-HCG SERPL-ACNC: ABNORMAL MIU/ML (ref 0–5)

## 2023-05-24 NOTE — PROGRESS NOTES
VISIT: Patient presents to the office today for a viability scan  She was seen last week for her NOB at which time TV U/S done - (+) GS measuring mean sac diameter of 19 1 mm at 6w2d; (+) yolk sac; no fetal pole; 5/16 quant hcg 31,525 prog 13 49; CBC WNL; A positive antibody screen negative; 5/18 quant hcg 38,457 prog 14 56; She had a positive UPT on 4/2017; LMP was an exact date and her periods are very regular for her  Denies n/v/HA/cramping/vb/lof/edema/dv/smoking; urine neg/neg  PNVs + DHA - tolerating daily; r/marc 1 mg folic acid and DHA daily    TV us done - single GS with yolk sac appreciated measuring 24 3 mm by mean sac diameter at 7w0d; no fetal pole appreciated    Reviewed the findings on today's ultrasound unfortunately highly suspicious of a threatened SAB - reviewed unfortunately, not diagnostic at this time due to mean gestational diameter less than 25 mm and today's ultrasound 8 days from last ultrasound - reviewed this with patient  Most miscarriages will occur spontaneously and completely without intervention  Can consider cytotec or dilation and evacuation as well  Reviewed risks/benefits/side effects of all options  Pt to call office or report to the ER with fever/chills, severe cramping, abdominal or pelvic pain, vaginal bleeding heavier than the heaviest day of her period for more than 3 hours  Pt can take extra strength tylenol for cramping/pain and call if feels needs a stronger prescription  Patient aware to avoid ibuprofen  Pt aware nothing in the vagina, including tampon use, intercourse or taking baths  Will plan to follow quantitative hcg about every 2 weeks until reaches a non-pregnant value  Patient can expect bleeding to be heavier and more crampy than her normal period and may last slightly longer than her normal period  Patient to call or return to office with any persistent bleeding, cramping or pain   Patient can expect a menstrual period in about 4-5 weeks and advise abstinence until next cycle to avoid conception immediately following miscarriage  Recommend waiting approximately one normal menstrual cycle prior to trying for conception again  Patient to continue prenatal vitamin + DHA daily  Will plan an follow-up quant hcg at this time  RTO in 1 week for viability scan and discuss management options further  She is aware to contact the office with any bleeding or spotting  Ultrasound Probe Disinfection    A transvaginal ultrasound was performed     Probe identification: probe serial number [employer]: [id number]  Disinfection process: Disinfection was performed with High Level Disinfection Process (Trophon)    Daria Harding PA-C  05/24/23  10:18 AM

## 2023-05-25 ENCOUNTER — HOSPITAL ENCOUNTER (EMERGENCY)
Facility: HOSPITAL | Age: 23
Discharge: HOME | End: 2023-05-25
Attending: EMERGENCY MEDICINE

## 2023-05-25 VITALS
DIASTOLIC BLOOD PRESSURE: 52 MMHG | OXYGEN SATURATION: 100 % | RESPIRATION RATE: 16 BRPM | HEART RATE: 79 BPM | TEMPERATURE: 98.2 F | SYSTOLIC BLOOD PRESSURE: 99 MMHG

## 2023-05-25 DIAGNOSIS — O03.9 SPONTANEOUS ABORTION: Primary | ICD-10-CM

## 2023-05-25 DIAGNOSIS — R00.2 PALPITATION: ICD-10-CM

## 2023-05-25 DIAGNOSIS — E86.0 DEHYDRATION: Primary | ICD-10-CM

## 2023-05-25 LAB
ALBUMIN SERPL BCP-MCNC: 4.8 G/DL (ref 3.5–5)
ALP SERPL-CCNC: 51 U/L (ref 34–104)
ALT SERPL W P-5'-P-CCNC: 9 U/L (ref 7–52)
ANION GAP SERPL CALCULATED.3IONS-SCNC: 7 MMOL/L (ref 4–13)
AST SERPL W P-5'-P-CCNC: 12 U/L (ref 13–39)
ATRIAL RATE: 82 BPM
B-HCG SERPL-ACNC: ABNORMAL MIU/ML (ref 0–5)
BACTERIA UR QL AUTO: ABNORMAL /HPF
BASOPHILS # BLD AUTO: 0.05 THOUSANDS/ÂΜL (ref 0–0.1)
BASOPHILS NFR BLD AUTO: 0 % (ref 0–1)
BILIRUB SERPL-MCNC: 0.4 MG/DL (ref 0.2–1)
BILIRUB UR QL STRIP: NEGATIVE
BUN SERPL-MCNC: 11 MG/DL (ref 5–25)
CALCIUM SERPL-MCNC: 9.5 MG/DL (ref 8.4–10.2)
CHLORIDE SERPL-SCNC: 104 MMOL/L (ref 96–108)
CLARITY UR: CLEAR
CO2 SERPL-SCNC: 25 MMOL/L (ref 21–32)
COLOR UR: YELLOW
CREAT SERPL-MCNC: 0.59 MG/DL (ref 0.6–1.3)
EOSINOPHIL # BLD AUTO: 0.03 THOUSAND/ÂΜL (ref 0–0.61)
EOSINOPHIL NFR BLD AUTO: 0 % (ref 0–6)
ERYTHROCYTE [DISTWIDTH] IN BLOOD BY AUTOMATED COUNT: 11.9 % (ref 11.6–15.1)
GFR SERPL CREATININE-BSD FRML MDRD: 129 ML/MIN/1.73SQ M
GLUCOSE SERPL-MCNC: 96 MG/DL (ref 65–140)
GLUCOSE UR STRIP-MCNC: ABNORMAL MG/DL
HCT VFR BLD AUTO: 41.5 % (ref 34.8–46.1)
HGB BLD-MCNC: 13.9 G/DL (ref 11.5–15.4)
HGB UR QL STRIP.AUTO: NEGATIVE
IMM GRANULOCYTES # BLD AUTO: 0.08 THOUSAND/UL (ref 0–0.2)
IMM GRANULOCYTES NFR BLD AUTO: 1 % (ref 0–2)
KETONES UR STRIP-MCNC: NEGATIVE MG/DL
LEUKOCYTE ESTERASE UR QL STRIP: ABNORMAL
LYMPHOCYTES # BLD AUTO: 1.61 THOUSANDS/ÂΜL (ref 0.6–4.47)
LYMPHOCYTES NFR BLD AUTO: 11 % (ref 14–44)
MCH RBC QN AUTO: 29.8 PG (ref 26.8–34.3)
MCHC RBC AUTO-ENTMCNC: 33.5 G/DL (ref 31.4–37.4)
MCV RBC AUTO: 89 FL (ref 82–98)
MONOCYTES # BLD AUTO: 0.67 THOUSAND/ÂΜL (ref 0.17–1.22)
MONOCYTES NFR BLD AUTO: 5 % (ref 4–12)
MUCOUS THREADS UR QL AUTO: ABNORMAL
NEUTROPHILS # BLD AUTO: 12.16 THOUSANDS/ÂΜL (ref 1.85–7.62)
NEUTS SEG NFR BLD AUTO: 83 % (ref 43–75)
NITRITE UR QL STRIP: NEGATIVE
NON-SQ EPI CELLS URNS QL MICRO: ABNORMAL /HPF
NRBC BLD AUTO-RTO: 0 /100 WBCS
P AXIS: 57 DEGREES
PH UR STRIP.AUTO: 6 [PH]
PLATELET # BLD AUTO: 242 THOUSANDS/UL (ref 149–390)
PMV BLD AUTO: 10.8 FL (ref 8.9–12.7)
POTASSIUM SERPL-SCNC: 4.1 MMOL/L (ref 3.5–5.3)
PR INTERVAL: 150 MS
PROT SERPL-MCNC: 7.4 G/DL (ref 6.4–8.4)
PROT UR STRIP-MCNC: ABNORMAL MG/DL
QRS AXIS: 89 DEGREES
QRSD INTERVAL: 80 MS
QT INTERVAL: 366 MS
QTC INTERVAL: 427 MS
RBC # BLD AUTO: 4.67 MILLION/UL (ref 3.81–5.12)
RBC #/AREA URNS AUTO: ABNORMAL /HPF
SODIUM SERPL-SCNC: 136 MMOL/L (ref 135–147)
SP GR UR STRIP.AUTO: 1.03 (ref 1–1.03)
T WAVE AXIS: 66 DEGREES
UROBILINOGEN UR STRIP-ACNC: <2 MG/DL
VENTRICULAR RATE: 82 BPM
WBC # BLD AUTO: 14.6 THOUSAND/UL (ref 4.31–10.16)
WBC #/AREA URNS AUTO: ABNORMAL /HPF

## 2023-05-25 RX ORDER — MISOPROSTOL 200 UG/1
800 TABLET ORAL ONCE
Qty: 4 TABLET | Refills: 0 | Status: SHIPPED | OUTPATIENT
Start: 2023-05-25 | End: 2023-05-25

## 2023-05-25 RX ADMIN — SODIUM CHLORIDE 500 ML: 0.9 INJECTION, SOLUTION INTRAVENOUS at 14:22

## 2023-05-25 NOTE — ED PROVIDER NOTES
"History  Chief Complaint   Patient presents with   • Palpitations     Pt reports waking up this am with palpitations  Went to school and saw HR was 140s while sitting  Denies cp/sob  Denies cardiac hx  Had dx miscarriage yesterday     HPI   21 yo female with PMH anxiety, depression, ADHD, PCOS presents with lightheadeddness, palpitations, shakiness that occurred twice this morning  IT first occurred shortly after waking up when she was standing and getting ready  She did not pass out  The episode was brief, lasting less than 5 minutes and resolved with rest  She went to class and the episode occurred again while sitting  She checked her hear rate and noted it to be 142 on her smart watch  She then came to the ED for further evaluation  Currently she reports the palpitations and feeling of near syncope have resolved but she still feels \"off\" and \"foggy  \"     Yesterday at a prenatal ultrasound she was noted to have a spontaneous  at approximately 7w0d gestation  She was prescribed misoprostol but has not yet started taking it  She denies any current vaginal bleedings  She has been eating and drinking well in the past 48 hours  She reports she feels she is mentally handling the miscarriage well  She denies chest pain, SOB, headaches, dizziness, URI symptoms, nausea, vomiting, abdominal pain, bowel habit changes  She does reports some recent urine frequency without dysuria or pain  She has a history of vasovagal syncope and reports this felt similar to that but she did not pass out this time  Prior to Admission Medications   Prescriptions Last Dose Informant Patient Reported? Taking?    Prenatal Multivit-Min-Fe-FA (PRE-MADHURI PO)   Yes No   Sig: Take by mouth   albuterol (Ventolin HFA) 90 mcg/act inhaler   No No   Sig: Inhale 2 puffs every 6 (six) hours as needed for wheezing   miSOPROStol (Cytotec) 200 mcg tablet   No No   Sig: Take 4 tablets (800 mcg total) by mouth once for 1 dose    " Facility-Administered Medications: None       Past Medical History:   Diagnosis Date   • ADHD, predominantly inattentive type 7/15/2016   • Anemia    • Anxiety disorder 3/24/2017   • Depression    • Miscarriage    • Polycystic ovary syndrome        Past Surgical History:   Procedure Laterality Date   • NO PAST SURGERIES         Family History   Problem Relation Age of Onset   • Diabetes Mother    • Cervical cancer Mother    • Heart disease Brother    • No Known Problems Paternal Grandmother    • No Known Problems Father    • No Known Problems Sister    • No Known Problems Daughter    • Heart attack Maternal Grandmother    • Heart attack Maternal Grandfather    • No Known Problems Paternal Grandfather    • No Known Problems Sister    • No Known Problems Brother    • No Known Problems Brother      I have reviewed and agree with the history as documented  E-Cigarette/Vaping   • E-Cigarette Use Former User    • Comments quit       E-Cigarette/Vaping Substances   • Nicotine Yes    • THC No    • CBD No    • Flavoring Yes    • Other No    • Unknown No      Social History     Tobacco Use   • Smoking status: Former     Packs/day: 0 00     Years: 2 00     Total pack years: 0 00     Types: Cigarettes     Quit date:      Years since quittin 3     Passive exposure: Past   • Smokeless tobacco: Former   • Tobacco comments:     Quit    hyears ago    Vaping Use   • Vaping Use: Former   • Substances: Nicotine, Flavoring   Substance Use Topics   • Alcohol use: Not Currently     Comment: occ   • Drug use: Never     Comment: none        Review of Systems   Constitutional: Negative for activity change, appetite change, chills, fatigue and fever  HENT: Negative for congestion, rhinorrhea and sore throat  Eyes: Negative for visual disturbance  Respiratory: Negative for cough and shortness of breath  Cardiovascular: Positive for palpitations  Negative for chest pain and leg swelling     Gastrointestinal: Negative for abdominal pain, constipation, diarrhea, nausea and vomiting  Genitourinary: Positive for frequency  Negative for decreased urine volume and dysuria  Skin: Negative for rash  Neurological: Positive for light-headedness  Negative for dizziness, syncope and headaches  Psychiatric/Behavioral: Negative for dysphoric mood  The patient is not nervous/anxious  Physical Exam  ED Triage Vitals   Temperature Pulse Respirations Blood Pressure SpO2   05/25/23 1238 05/25/23 1238 05/25/23 1238 05/25/23 1238 05/25/23 1238   98 2 °F (36 8 °C) 105 18 116/60 99 %      Temp Source Heart Rate Source Patient Position - Orthostatic VS BP Location FiO2 (%)   05/25/23 1238 05/25/23 1238 05/25/23 1448 05/25/23 1238 --   Oral Monitor Lying - Orthostatic VS Right arm       Pain Score       --                    Orthostatic Vital Signs  Vitals:    05/25/23 1448 05/25/23 1449 05/25/23 1450 05/25/23 1500   BP: 101/56 111/55 108/58 99/52   Pulse:    79   Patient Position - Orthostatic VS: Lying - Orthostatic VS Sitting - Orthostatic VS Standing - Orthostatic VS        Physical Exam  Vitals reviewed  Constitutional:       General: She is not in acute distress  Appearance: She is normal weight  She is not ill-appearing  HENT:      Head: Normocephalic and atraumatic  Right Ear: Tympanic membrane, ear canal and external ear normal       Left Ear: Tympanic membrane, ear canal and external ear normal       Nose: Nose normal  No congestion or rhinorrhea  Mouth/Throat:      Mouth: Mucous membranes are moist       Pharynx: Oropharynx is clear  No oropharyngeal exudate or posterior oropharyngeal erythema  Eyes:      General:         Right eye: No discharge  Left eye: No discharge  Extraocular Movements: Extraocular movements intact  Conjunctiva/sclera: Conjunctivae normal       Pupils: Pupils are equal, round, and reactive to light     Cardiovascular:      Rate and Rhythm: Normal rate and regular rhythm  Heart sounds: Normal heart sounds  No murmur heard  No friction rub  No gallop  Pulmonary:      Effort: Pulmonary effort is normal       Breath sounds: Normal breath sounds  No wheezing, rhonchi or rales  Abdominal:      General: Abdomen is flat  Bowel sounds are normal       Palpations: Abdomen is soft  Tenderness: There is no abdominal tenderness  Musculoskeletal:      Cervical back: Neck supple  Right lower leg: No edema  Left lower leg: No edema  Skin:     General: Skin is warm  Neurological:      General: No focal deficit present  Mental Status: She is alert  Psychiatric:         Mood and Affect: Mood normal          Behavior: Behavior normal          ED Medications  Medications   sodium chloride 0 9 % bolus 500 mL (500 mL Intravenous New Bag 5/25/23 1422)       Diagnostic Studies  Results Reviewed     Procedure Component Value Units Date/Time    Urine Microscopic [[id number]]  (Abnormal) Collected: 05/25/23 1421    Lab Status: Final result Specimen: Urine, Clean Catch Updated: 05/25/23 1602     RBC, UA None Seen /hpf      WBC, UA 4-10 /hpf      Epithelial Cells Occasional /hpf      Bacteria, UA Occasional /hpf      MUCUS THREADS Moderate     URINE COMMENT --    hCG, quantitative [[id number]]  (Abnormal) Collected: 05/25/23 1421    Lab Status: Final result Specimen: Blood from Arm, Left Updated: 05/25/23 1541     HCG, Quant 32,859 mIU/mL     Narrative:       Expected Ranges:    HCG results between 5 and 25 mIU/mL may be indicative of early pregnancy but should be interpreted in light of the total clinical presentation  HCG can rise to detectable levels in cecelia and post menopausal women (0-11 6 mIU/mL)       Approximate               Approximate HCG  Gestation age          Concentration ( mIU/mL)  _____________          ______________________   Radonna Guion                      HCG values  0 2-1                       5-50  1-2                           2-3 100-5000  3-4                         500-78527  4-5                         1000-74810  5-6                         [id number]  6-8                         53040-147521  8-12                        90066-400541      Comprehensive metabolic panel [[id number]]  (Abnormal) Collected: 05/25/23 1421    Lab Status: Final result Specimen: Blood from Arm, Left Updated: 05/25/23 1457     Sodium 136 mmol/L      Potassium 4 1 mmol/L      Chloride 104 mmol/L      CO2 25 mmol/L      ANION GAP 7 mmol/L      BUN 11 mg/dL      Creatinine 0 59 mg/dL      Glucose 96 mg/dL      Calcium 9 5 mg/dL      AST 12 U/L      ALT 9 U/L      Alkaline Phosphatase 51 U/L      Total Protein 7 4 g/dL      Albumin 4 8 g/dL      Total Bilirubin 0 40 mg/dL      eGFR 129 ml/min/1 73sq m     Narrative:      [place] guidelines for Chronic Kidney Disease (CKD):   •  Stage 1 with normal or high GFR (GFR > 90 mL/min/1 73 square meters)  •  Stage 2 Mild CKD (GFR = 60-89 mL/min/1 73 square meters)  •  Stage 3A Moderate CKD (GFR = 45-59 mL/min/1 73 square meters)  •  Stage 3B Moderate CKD (GFR = 30-44 mL/min/1 73 square meters)  •  Stage 4 Severe CKD (GFR = 15-29 mL/min/1 73 square meters)  •  Stage 5 End Stage CKD (GFR <15 mL/min/1 73 square meters)  Note: GFR calculation is accurate only with a steady state creatinine    UA w Reflex to Microscopic w Reflex to Culture [[id number]]  (Abnormal) Collected: 05/25/23 1421    Lab Status: Final result Specimen: Urine, Clean Catch Updated: 05/25/23 1440     Color, UA Yellow     Clarity, UA Clear     Specific Gravity, UA 1 031     pH, UA 6 0     Leukocytes, UA Small     Nitrite, UA Negative     Protein, UA Trace mg/dl      Glucose,  (1/10%) mg/dl      Ketones, UA Negative mg/dl      Urobilinogen, UA <2 0 mg/dl      Bilirubin, UA Negative     Occult Blood, UA Negative     URINE COMMENT --    Urine culture [[id number]] Collected: 05/25/23 1421    Lab Status:  In process Specimen: Urine, Clean Catch Updated: 05/25/23 1440    CBC and differential [[id number]]  (Abnormal) Collected: 05/25/23 1421    Lab Status: Final result Specimen: Blood from Arm, Left Updated: 05/25/23 1436     WBC 14 60 Thousand/uL      RBC 4 67 Million/uL      Hemoglobin 13 9 g/dL      Hematocrit 41 5 %      MCV 89 fL      MCH 29 8 pg      MCHC 33 5 g/dL      RDW 11 9 %      MPV 10 8 fL      Platelets 701 Thousands/uL      nRBC 0 /100 WBCs      Neutrophils Relative 83 %      Immat GRANS % 1 %      Lymphocytes Relative 11 %      Monocytes Relative 5 %      Eosinophils Relative 0 %      Basophils Relative 0 %      Neutrophils Absolute 12 16 Thousands/µL      Immature Grans Absolute 0 08 Thousand/uL      Lymphocytes Absolute 1 61 Thousands/µL      Monocytes Absolute 0 67 Thousand/µL      Eosinophils Absolute 0 03 Thousand/µL      Basophils Absolute 0 05 Thousands/µL                  No orders to display         Procedures  ECG 12 Lead Documentation Only    Date/Time: 5/25/2023 4:14 PM    Performed by: Dayan Morales DO  Authorized by: Dayan Morales DO    Indications / Diagnosis:  Palpitations  ECG reviewed by me, the ED Provider: yes    Patient location:  ED  Previous ECG:     Previous ECG:  Compared to current    Comparison ECG info:  Evidence of right atrial enlargement no longer present    Similarity:  Changes noted  Interpretation:     Interpretation: normal    Rate:     ECG rate:  82    ECG rate assessment: normal    Rhythm:     Rhythm: sinus rhythm    Ectopy:     Ectopy: none    QRS:     QRS axis:  Normal  Conduction:     Conduction: normal    ST segments:     ST segments:  Normal  T waves:     T waves: non-specific            ED Course  ED Course as of 05/25/23 1615   Thu May 25, 2023   1406 19 yo female presents with lightheadeddness, palpitations, shakiness that occurred twice this morning  EKG on presentation was NSR with rat 81   Patient also reporting recent urine frequency, and documented spontaneous  yesterday  Will check CBC, CMP, UA  Check orthostatics  Give 500cc bolus NS     1528 UA consistent with dehydration but concerning for possible UTI  Urine reflexed to microscopy and culture  Patient symptomatically improved following IVF  Will await Urine microscopy result prior to discharge home  1529 Orthostatics negative   1605 Urine microspcopy without significant bateria  Do not suspect UTI  Patient stable for discharge  Will follow up with phone call if urine culture returns positive  Patient to follow up with PCP and OBGYN outpatient and repeat HCG quant in 2 weeks  Medical Decision Making  19 yo female presents with lightheadeddness, palpitations, shakiness that occurred twice this morning  EKG on presentation was NSR with rat 81  Patient also reporting recent urine frequency, and documented spontaneous  yesterday  CBC with elevated WBC, but without other signs concerning for infection  UA without significant bacteria  Suspect dehydration  Patient symptomatically improved following 500cc bolus NS  CMP and orthostatic blood pressures unremarkable  Urine microspcopy without significant bateria  Do not suspect UTI  Patient stable for discharge  Will follow up with phone call if urine culture returns positive  Patient to follow up with PCP and OBGYN outpatient and repeat HCG quant in 2 weeks  Dehydration: acute illness or injury  Palpitation: acute illness or injury  Amount and/or Complexity of Data Reviewed  Labs: ordered              Disposition  Final diagnoses:   Dehydration   Palpitation     Time reflects when diagnosis was documented in both MDM as applicable and the Disposition within this note     Time User Action Codes Description Comment    2023  4:07 PM Abhilash Atkins [E86 0] Dehydration     2023  4:07 PM Felisa Cosme [R00 2] Palpitation       ED Disposition     ED Disposition   Discharge    Condition   Stable    Date/Time   Thu May 25, 2023  3:38 PM Comment   Marilynn Guido discharge to home/self care  Follow-up Information     Follow up With Specialties Details Why [address], [place] Internal Medicine, Pediatrics  As needed, If symptoms worsen [address]  [address] [address] [phone]            Patient's Medications   Discharge Prescriptions    No medications on file     No discharge procedures on file  PDMP Review     None           ED Provider  Attending physically available and evaluated Link Lc WALLACE managed the patient along with the ED Attending      Electronically Signed by         Hilaria Lindquist DO  05/25/23 4108

## 2023-05-26 LAB — BACTERIA UR CULT: NORMAL

## 2023-05-27 NOTE — ED ATTENDING ATTESTATION
5/25/2023  IMaverick MD, saw and evaluated the patient  I have discussed the patient with the resident/non-physician practitioner and agree with the resident's/non-physician practitioner's findings, Plan of Care, and MDM as documented in the resident's/non-physician practitioner's note, except where noted  All available labs and Radiology studies were reviewed  I was present for key portions of any procedure(s) performed by the resident/non-physician practitioner and I was immediately available to provide assistance  At this point I agree with the current assessment done in the Emergency Department  I have conducted an independent evaluation of this patient a history and physical is as follows:    69-year-old female presents to the emergency department today after experiencing palpitations, lightheadedness and shakiness  This occurs in the setting of her having had a first OB appointment for her current pregnancy yesterday and ultrasound revealing evidence of spontaneous AB  She has not experienced any cramping or bleeding thus far  This morning while getting her daughter ready for school and standing to do so she briefly experienced sensation that her heart was beating fast along with the lightheadedness and shakiness  This resolved within a few minutes with rest   The second episode occurred while she was seated in class  Heart rate became rapid  Her watch identified her rate in the low 140s for a few minutes  Following rapid heart rate she still has sensation of feeling foggy  She has had near syncope which has been attributed to vasovagal etiology in the past   She endorses having been eating and drinking appropriately and notes that her blood pressure usually runs on the low side  Brother with dextrocardia  She has had an echocardiogram in the past which was unremarkable  At time of evaluation she has received IV fluids and reports feeling better    She no longer has sensation of tachycardia or lightheadedness  She appears well without pallor or icterus  Heart sounds regular  Lungs clear to auscultation bilaterally  No abdominal tenderness, lower extremity swelling or tenderness  blood work unremarkable  hCG quantitative will be obtained here as patient had been intending to have this drawn as an outpatient  Initial portion of UA reveals concentration  Some leukocyte Estrace is also appreciated although patient denies having dysuria  She has had urinary frequency throughout the pregnancy  Will await microscopy to determine if this will be treated as UTI/antibiotics initiated  Patient does feel comfortable for discharge and intends to  prescription for misoprostol ordered at yesterday's appointment        ED Course         Critical Care Time  Procedures

## 2023-05-30 ENCOUNTER — TELEPHONE (OUTPATIENT)
Dept: OBGYN CLINIC | Facility: CLINIC | Age: 23
End: 2023-05-30

## 2023-05-30 NOTE — TELEPHONE ENCOUNTER
Pt called and left message on the voicemail in regards to her recent blood work results from the ER  Left message for pt to call the office  It looks like she had a repeat viability scan scheduled with Kan Bhandari 6/1/2023 that was cancelled

## 2023-06-01 DIAGNOSIS — R00.2 PALPITATIONS: Primary | ICD-10-CM

## 2023-06-01 NOTE — PROGRESS NOTES
We will check cardiac rhythm monitoring-2 weeks-for recurrent palpitations, leading to an ER visit when she felt that her heart rate was 142 bpm on her smart watch but appeared to have resolved by the time she went to the ER

## 2023-06-05 ENCOUNTER — TELEPHONE (OUTPATIENT)
Dept: OBGYN CLINIC | Facility: CLINIC | Age: 23
End: 2023-06-05

## 2023-06-05 NOTE — TELEPHONE ENCOUNTER
Spoke with Chin Mittal - took cytotec Friday night 6/2; Experiencing cramping, passing of clots and regular period flow; Continue to monitor; reviewed any excessive vaginal bleeding, abd/pelvic pain, fever/chills to report to ER; Can use tylenol for cramping; understand nothing in vaginal canal  Plan repeat quant hcg this Friday; patient to call with any further questions/concerns

## 2023-06-09 DIAGNOSIS — O03.9 SPONTANEOUS ABORTION: Primary | ICD-10-CM

## 2023-06-12 NOTE — TELEPHONE ENCOUNTER
Called to check on pt  Pt reports everything is fine, has no symptoms or concerns at this time  Pt states bleeding and cramping have resolved as of last Friday  Pt aware hcg ordered to monitor decrease in level, will complete and await results for further recommendations  Pt has no questions at this time

## 2023-06-21 ENCOUNTER — PATIENT MESSAGE (OUTPATIENT)
Dept: OBGYN CLINIC | Facility: CLINIC | Age: 23
End: 2023-06-21

## 2023-07-05 ENCOUNTER — CLINICAL SUPPORT (OUTPATIENT)
Dept: CARDIOLOGY CLINIC | Facility: CLINIC | Age: 23
End: 2023-07-05
Payer: MEDICARE

## 2023-07-05 DIAGNOSIS — R00.2 PALPITATIONS: ICD-10-CM

## 2023-07-05 PROCEDURE — 93248 EXT ECG>7D<15D REV&INTERPJ: CPT | Performed by: INTERNAL MEDICINE

## 2023-07-06 ENCOUNTER — DOCUMENTATION (OUTPATIENT)
Dept: NON INVASIVE DIAGNOSTICS | Facility: HOSPITAL | Age: 23
End: 2023-07-06

## 2023-07-06 NOTE — PROGRESS NOTES
Results of cardiac rhythm monitoring-o monitor-    Duration of monitoring-14 days  6/12/2023-6/26/2020    Background data:  Patient had a min HR of 50 bpm, max HR of 165 bpm, and avg HR of 88 bpm.   Predominant underlying rhythm was Sinus Rhythm. Supraventricular ectopy:   Isolated SVEs were rare (<1.0%), and no SVE Couplets or SVE Triplets were present. Ventricular ectopy:  Isolated VEs were rare (<1.0%, 107), VE Triplets were rare (<1.0%, 1), and no VE Couplets were present.     Symptoms:  Single symptom episode-correlated with mild sinus tachycardia with heart rates around 106 bpm.  No significant arrhythmias were noted    Impression:  Normal 2 weeks of cardiac rhythm monitoring  No significant arrhythmias  Overall low burden of supraventricular ectopy and ventricular ectopy  Single symptom episode-correlated with mild sinus tachycardia at 106 bpm

## 2023-07-13 ENCOUNTER — TELEPHONE (OUTPATIENT)
Dept: CARDIOLOGY CLINIC | Facility: CLINIC | Age: 23
End: 2023-07-13

## 2023-07-13 NOTE — TELEPHONE ENCOUNTER
----- Message -----  From: Yury Mcgregor MD  Sent: 7/6/2023  10:54 AM EDT  To: [facility] [place]    Results are normal. Please notify pt.

## 2023-09-07 ENCOUNTER — OFFICE VISIT (OUTPATIENT)
Dept: FAMILY MEDICINE CLINIC | Facility: CLINIC | Age: 23
End: 2023-09-07
Payer: MEDICARE

## 2023-09-07 VITALS
RESPIRATION RATE: 14 BRPM | HEIGHT: 67 IN | DIASTOLIC BLOOD PRESSURE: 60 MMHG | SYSTOLIC BLOOD PRESSURE: 92 MMHG | HEART RATE: 80 BPM | TEMPERATURE: 97.8 F | OXYGEN SATURATION: 99 % | WEIGHT: 116 LBS | BODY MASS INDEX: 18.21 KG/M2

## 2023-09-07 DIAGNOSIS — J00 NASOPHARYNGITIS ACUTE: ICD-10-CM

## 2023-09-07 DIAGNOSIS — J20.9 ACUTE BRONCHITIS, UNSPECIFIED ORGANISM: Primary | ICD-10-CM

## 2023-09-07 PROCEDURE — 99214 OFFICE O/P EST MOD 30 MIN: CPT | Performed by: FAMILY MEDICINE

## 2023-09-07 RX ORDER — AZITHROMYCIN 250 MG/1
TABLET, FILM COATED ORAL
Qty: 6 TABLET | Refills: 0 | Status: SHIPPED | OUTPATIENT
Start: 2023-09-07 | End: 2023-09-12

## 2023-09-08 NOTE — PROGRESS NOTES
Subjective:      Patient ID: Ceclia Lombard is a 20 y.o. female. 14 days of congestion, cough with greenish phlegm, and headache, sinus pressure    Headache  Cough  Associated symptoms include headaches. Pertinent negatives include no chest pain, eye redness, fever, myalgias, rash, rhinorrhea, sore throat, shortness of breath or wheezing. Past Medical History:   Diagnosis Date   • ADHD, predominantly inattentive type 07/15/2016   • Anemia    • Anxiety disorder 03/24/2017   • Depression    • Headache(784.0) 09/1/23    Taking extra strength Tylenol but only helps a little   • Miscarriage    • Polycystic ovary syndrome        Family History   Problem Relation Age of Onset   • Diabetes Mother    • Cervical cancer Mother    • Hyperlipidemia Mother    • Heart disease Brother    • No Known Problems Paternal Grandmother    • No Known Problems Father    • No Known Problems Sister    • No Known Problems Daughter    • Heart attack Maternal Grandmother    • Hyperlipidemia Maternal Grandmother    • Heart attack Maternal Grandfather    • No Known Problems Paternal Grandfather    • No Known Problems Sister    • No Known Problems Brother    • No Known Problems Brother        Past Surgical History:   Procedure Laterality Date   • NO PAST SURGERIES          reports that she quit smoking about 2 years ago. Her smoking use included cigarettes. She has been exposed to tobacco smoke. She has quit using smokeless tobacco. She reports current alcohol use. She reports that she does not use drugs. Current Outpatient Medications:   •  albuterol (Ventolin HFA) 90 mcg/act inhaler, Inhale 2 puffs every 6 (six) hours as needed for wheezing, Disp: 18 g, Rfl: 5  •  azithromycin (Zithromax) 250 mg tablet, Take 2 tablets (500 mg total) by mouth daily for 1 day, THEN 1 tablet (250 mg total) daily for 4 days. , Disp: 6 tablet, Rfl: 0  •  miSOPROStol (Cytotec) 200 mcg tablet, Take 4 tablets (800 mcg total) by mouth once for 1 dose, Disp: 4 tablet, Rfl: 0  •  Prenatal Multivit-Min-Fe-FA (PRE- PO), Take by mouth (Patient not taking: Reported on 2023), Disp: , Rfl:     The following portions of the patient's history were reviewed and updated as appropriate: allergies, current medications, past family history, past medical history, past social history, past surgical history and problem list.    Review of Systems   Constitutional: Negative for fatigue and fever. HENT: Positive for congestion and sinus pressure. Negative for facial swelling, mouth sores, rhinorrhea, sore throat and trouble swallowing. Eyes: Negative for pain and redness. Respiratory: Positive for cough. Negative for shortness of breath and wheezing. Cardiovascular: Negative for chest pain, palpitations and leg swelling. Gastrointestinal: Negative for abdominal pain, blood in stool, constipation, diarrhea and nausea. Genitourinary: Negative for dysuria, hematuria and urgency. Musculoskeletal: Negative for arthralgias, back pain and myalgias. Skin: Negative for rash and wound. Neurological: Positive for headaches. Negative for seizures and syncope. Hematological: Negative for adenopathy. Psychiatric/Behavioral: Negative for agitation and behavioral problems. PHQ-2/9 Depression Screening    Little interest or pleasure in doing things: 0 - not at all  Feeling down, depressed, or hopeless: 0 - not at all  PHQ-2 Score: 0  PHQ-2 Interpretation: Negative depression screen             Objective:    BP 92/60 (BP Location: Left arm, Patient Position: Sitting, Cuff Size: Adult)   Pulse 80   Temp 97.8 °F (36.6 °C) (Tympanic)   Resp 14   Ht 5' 7" (1.702 m)   Wt 52.6 kg (116 lb)   SpO2 99%   BMI 18.17 kg/m²      Physical Exam  Vitals and nursing note reviewed. Constitutional:       Appearance: Normal appearance. She is well-developed. She is not ill-appearing. HENT:      Head: Normocephalic and atraumatic.       Right Ear: External ear normal.      Left Ear: External ear normal.      Nose: Nose normal.      Mouth/Throat:      Mouth: Mucous membranes are moist.      Pharynx: No oropharyngeal exudate or posterior oropharyngeal erythema. Eyes:      General: No scleral icterus. Right eye: No discharge. Left eye: No discharge. Conjunctiva/sclera: Conjunctivae normal.   Cardiovascular:      Rate and Rhythm: Normal rate. Heart sounds: No murmur heard. No gallop. Pulmonary:      Effort: Pulmonary effort is normal. No respiratory distress. Breath sounds: No stridor. Rhonchi (left lower lobe) present. No wheezing or rales. Abdominal:      Palpations: Abdomen is soft. Tenderness: There is no abdominal tenderness. Musculoskeletal:         General: No tenderness or deformity. Skin:     Findings: No erythema or rash. Neurological:      Mental Status: She is alert. Mental status is at baseline.    Psychiatric:         Behavior: Behavior normal.         Judgment: Judgment normal.           Recent Results (from the past 8736 hour(s))   Echo complete w/ contrast if indicated    Collection Time: 11/02/22 10:50 AM   Result Value Ref Range    LA size 2.7 cm    LVPWd 0.60 cm    Left Atrium Area-systolic Apical Two Chamber 8.5 cm2    Left Atrium Area-systolic Four Chamber 27.2 cm2    MV E' Tissue Velocity Septal 11 cm/s    IVSd 0.39 cm    LV DIASTOLIC VOLUME (MOD BIPLANE) 2D 56 mL    LEFT VENTRICLE SYSTOLIC VOLUME (MOD BIPLANE) 2D 22 mL    Left ventricular stroke volume (2D) 34.00 mL    A4C EF 64 %    LA length (A2C) 3.60 cm    LVIDd 3.60 cm    IVS 0.6 cm    LVIDS 2.50 cm    FS 31 28 - 44 %    Asc Ao 2.4 cm    Ao root 1.90 cm    RVID d 2.6 cm    MV valve area p 1/2 method 6.11 cm2    E wave deceleration time 124 ms    MV Peak E Saleem 65 cm/s    MV Peak A Saleem 0.59 m/s    YESSENIA A4C 11.2 cm2    RAA A4C 8.3 cm2    MV stenosis pressure 1/2 time 36 ms    LVSV, 2D 34 mL    LV EF 60    CBC and differential    Collection Time: 05/16/23  2:23 PM Result Value Ref Range    WBC 7.70 4.31 - 10.16 Thousand/uL    RBC 4.19 3.81 - 5.12 Million/uL    Hemoglobin 12.6 11.5 - 15.4 g/dL    Hematocrit 37.5 34.8 - 46.1 %    MCV 90 82 - 98 fL    MCH 30.1 26.8 - 34.3 pg    MCHC 33.6 31.4 - 37.4 g/dL    RDW 12.0 11.6 - 15.1 %    MPV 10.7 8.9 - 12.7 fL    Platelets 253 620 - 747 Thousands/uL    nRBC 0 /100 WBCs    Neutrophils Relative 58 43 - 75 %    Immat GRANS % 1 0 - 2 %    Lymphocytes Relative 31 14 - 44 %    Monocytes Relative 7 4 - 12 %    Eosinophils Relative 2 0 - 6 %    Basophils Relative 1 0 - 1 %    Neutrophils Absolute 4.55 1.85 - 7.62 Thousands/µL    Immature Grans Absolute 0.04 0.00 - 0.20 Thousand/uL    Lymphocytes Absolute 2.36 0.60 - 4.47 Thousands/µL    Monocytes Absolute 0.56 0.17 - 1.22 Thousand/µL    Eosinophils Absolute 0.14 0.00 - 0.61 Thousand/µL    Basophils Absolute 0.05 0.00 - 0.10 Thousands/µL   Type and screen    Collection Time: 05/16/23  2:23 PM   Result Value Ref Range    ABO Grouping A     Rh Factor Positive     Antibody Screen Negative     Specimen Expiration Date 20230519    hCG, quantitative    Collection Time: 05/16/23  2:23 PM   Result Value Ref Range    HCG, Quant 31,525 (H) 0 - 5 mIU/mL   Progesterone    Collection Time: 05/16/23  2:23 PM   Result Value Ref Range    Progesterone 13.49 See Comment ng/mL   hCG, quantitative    Collection Time: 05/18/23 11:58 AM   Result Value Ref Range    HCG, Quant 38,457 (H) 0 - 5 mIU/mL   Progesterone    Collection Time: 05/18/23 11:58 AM   Result Value Ref Range    Progesterone 14.56 See Comment ng/mL   hCG, quantitative    Collection Time: 05/24/23  1:09 PM   Result Value Ref Range    HCG, Quant 29,518 (H) 0 - 5 mIU/mL   ECG 12 lead    Collection Time: 05/25/23  1:35 PM   Result Value Ref Range    Ventricular Rate 82 BPM    Atrial Rate 82 BPM    ME Interval 150 ms    QRSD Interval 80 ms    QT Interval 366 ms    QTC Interval 427 ms    P [place] 57 degrees    QRS Axis 89 degrees    T Wave Axis 66 degrees   CBC and differential    Collection Time: 05/25/23  2:21 PM   Result Value Ref Range    WBC 14.60 (H) 4.31 - 10.16 Thousand/uL    RBC 4.67 3.81 - 5.12 Million/uL    Hemoglobin 13.9 11.5 - 15.4 g/dL    Hematocrit 41.5 34.8 - 46.1 %    MCV 89 82 - 98 fL    MCH 29.8 26.8 - 34.3 pg    MCHC 33.5 31.4 - 37.4 g/dL    RDW 11.9 11.6 - 15.1 %    MPV 10.8 8.9 - 12.7 fL    Platelets 678 740 - 082 Thousands/uL    nRBC 0 /100 WBCs    Neutrophils Relative 83 (H) 43 - 75 %    Immat GRANS % 1 0 - 2 %    Lymphocytes Relative 11 (L) 14 - 44 %    Monocytes Relative 5 4 - 12 %    Eosinophils Relative 0 0 - 6 %    Basophils Relative 0 0 - 1 %    Neutrophils Absolute 12.16 (H) 1.85 - 7.62 Thousands/µL    Immature Grans Absolute 0.08 0.00 - 0.20 Thousand/uL    Lymphocytes Absolute 1.61 0.60 - 4.47 Thousands/µL    Monocytes Absolute 0.67 0.17 - 1.22 Thousand/µL    Eosinophils Absolute 0.03 0.00 - 0.61 Thousand/µL    Basophils Absolute 0.05 0.00 - 0.10 Thousands/µL   Comprehensive metabolic panel    Collection Time: 05/25/23  2:21 PM   Result Value Ref Range    Sodium 136 135 - 147 mmol/L    Potassium 4.1 3.5 - 5.3 mmol/L    Chloride 104 96 - 108 mmol/L    CO2 25 21 - 32 mmol/L    ANION GAP 7 4 - 13 mmol/L    BUN 11 5 - 25 mg/dL    Creatinine 0.59 (L) 0.60 - 1.30 mg/dL    Glucose 96 65 - 140 mg/dL    Calcium 9.5 8.4 - 10.2 mg/dL    AST 12 (L) 13 - 39 U/L    ALT 9 7 - 52 U/L    Alkaline Phosphatase 51 34 - 104 U/L    Total Protein 7.4 6.4 - 8.4 g/dL    Albumin 4.8 3.5 - 5.0 g/dL    Total Bilirubin 0.40 0.20 - 1.00 mg/dL    eGFR 129 ml/min/1.73sq m   UA w Reflex to Microscopic w Reflex to Culture    Collection Time: 05/25/23  2:21 PM    Specimen: Urine, Clean Catch   Result Value Ref Range    Color, UA Yellow     Clarity, UA Clear     Specific Gravity, UA 1.031 (H) 1.003 - 1.030    pH, UA 6.0 4.5, 5.0, 5.5, 6.0, 6.5, 7.0, 7.5, 8.0    Leukocytes, UA Small (A) Negative    Nitrite, UA Negative Negative    Protein, UA Trace (A) Negative mg/dl    Glucose,  (1/10%) (A) Negative mg/dl    Ketones, UA Negative Negative mg/dl    Urobilinogen, UA <2.0 <2.0 mg/dl mg/dl    Bilirubin, UA Negative Negative    Occult Blood, UA Negative Negative    URINE COMMENT     Urine Microscopic    Collection Time: 05/25/23  2:21 PM   Result Value Ref Range    RBC, UA None Seen None Seen, 1-2 /hpf    WBC, UA 4-10 (A) None Seen, 1-2 /hpf    Epithelial Cells Occasional None Seen, Occasional /hpf    Bacteria, UA Occasional None Seen, Occasional /hpf    MUCUS THREADS Moderate (A) None Seen    URINE COMMENT     Urine culture    Collection Time: 05/25/23  2:21 PM    Specimen: Urine, Clean Catch   Result Value Ref Range    Urine Culture <10,000 cfu/ml    hCG, quantitative    Collection Time: 05/25/23  2:21 PM   Result Value Ref Range    HCG, Quant 32,859 (H) 0 - 5 mIU/mL           Assessment/Plan:  Problem List Items Addressed This Visit    None  Visit Diagnoses     Acute bronchitis, unspecified organism    -  Primary    Relevant Medications    azithromycin (Zithromax) 250 mg tablet    Nasopharyngitis acute            Likely started as viral UPPER RESPIRATORY INFECTION, complicated with acute bronchitis. Symptoms ongoing and worsening for 14 days, start azithromycin for 5 days,otc decongestant prn    Patient is aware to call 9-1-1 and go to the ER for any red flag symptoms including chest pain, fever >103.5, shortness of breath, dizziness, lightheadedness or syncope or tingling, numbness or weakness of any extremity, headache that is not going away, confusion and more. Read package inserts for all medications before starting a new medications, call me if you have any questions. Patient was given opportunity to ask questions and all questions were answered. Disclaimer: Portions of the record may have been created with voice recognition software.  Occasional wrong word or "sound a like" substitutions may have occurred due to the inherent limitations of voice recognition software. Read the chart carefully and recognize, using context, where substitutions have occurred. I have used the Epic copy/forward function to compose this note. I have reviewed my current note to ensure it reflects the current patient status, exam, assessment and plan.

## 2024-01-23 ENCOUNTER — CONSULT (OUTPATIENT)
Dept: OBGYN CLINIC | Facility: CLINIC | Age: 24
End: 2024-01-23
Payer: MEDICARE

## 2024-01-23 VITALS
DIASTOLIC BLOOD PRESSURE: 58 MMHG | SYSTOLIC BLOOD PRESSURE: 102 MMHG | BODY MASS INDEX: 18.68 KG/M2 | WEIGHT: 119 LBS | HEIGHT: 67 IN

## 2024-01-23 DIAGNOSIS — Z78.9 TRYING TO GET PREGNANT: Primary | ICD-10-CM

## 2024-01-23 PROCEDURE — 99213 OFFICE O/P EST LOW 20 MIN: CPT | Performed by: OBSTETRICS & GYNECOLOGY

## 2024-01-23 NOTE — PROGRESS NOTES
"Assessment/Plan:     Diagnoses and all orders for this visit:    Trying to get pregnant     23-year-old female  Prior vaginal delivery  History of prior miscarriage  Trying to conceive  Plan  Prenatal vitamin daily  Trying to regular intercourse around ovulation for the next 2 months  Return to office in 2 months follow-up if no pregnancy partner semen analysis followed by ovulation induction  Patient also due for annual exam will be done next visit      Subjective:      Patient ID: Marilynn Guido is a 23 y.o. female.    HPI  23-year-old female present to the office today   Trying to conceive  Patient has recent blighted ovum/missed AB in August treated medically  Since then patient was having her menses every 28 to 34 days  Denies any breakthrough bleeding  Denies any pelvic pain  Denies any other complaint  Patient is trying to conceive  This is new partner he does not have any kids  Her blighted ovum pregnancy was with this partner  Explained to the patient I would recommend to continue regular intercourse around ovulation for the next 2 months if no pregnancy then we can consider partner semen analysis followed by ovulation induction  Plan explained and discussed with patient all patient questions answered and patient was satisfied  The following portions of the patient's history were reviewed and updated as appropriate: allergies, current medications, past family history, past medical history, past social history, past surgical history and problem list.    Review of Systems      Objective:      /58 (BP Location: Left arm, Patient Position: Sitting, Cuff Size: Adult)   Ht 5' 7\" (1.702 m)   Wt 54 kg (119 lb)   LMP 01/10/2024 (Exact Date)   BMI 18.64 kg/m²          Physical Exam  Constitutional:       Appearance: Normal appearance.   Neurological:      General: No focal deficit present.      Mental Status: She is alert and oriented to person, place, and time.   Psychiatric:         Mood and Affect: Mood " normal.         Behavior: Behavior normal.

## 2024-04-05 DIAGNOSIS — Z00.6 ENCOUNTER FOR EXAMINATION FOR NORMAL COMPARISON OR CONTROL IN CLINICAL RESEARCH PROGRAM: ICD-10-CM

## 2025-03-11 NOTE — PROGRESS NOTES
Pre-charting for Appointment      Reason for being seen today: follow up consult for fertility     Any current testing/imaging done prior to exam today:

## 2025-03-12 ENCOUNTER — OFFICE VISIT (OUTPATIENT)
Dept: OBGYN CLINIC | Facility: CLINIC | Age: 25
End: 2025-03-12
Payer: COMMERCIAL

## 2025-03-12 VITALS
HEIGHT: 67 IN | DIASTOLIC BLOOD PRESSURE: 76 MMHG | BODY MASS INDEX: 18.87 KG/M2 | SYSTOLIC BLOOD PRESSURE: 118 MMHG | WEIGHT: 120.2 LBS

## 2025-03-12 DIAGNOSIS — Z78.9 TRYING TO GET PREGNANT: ICD-10-CM

## 2025-03-12 DIAGNOSIS — Z01.419 ENCOUNTER FOR GYNECOLOGICAL EXAMINATION WITHOUT ABNORMAL FINDING: ICD-10-CM

## 2025-03-12 DIAGNOSIS — Z11.3 SCREENING EXAMINATION FOR STD (SEXUALLY TRANSMITTED DISEASE): ICD-10-CM

## 2025-03-12 DIAGNOSIS — Z01.419 WOMEN'S ANNUAL ROUTINE GYNECOLOGICAL EXAMINATION: Primary | ICD-10-CM

## 2025-03-12 PROCEDURE — S0612 ANNUAL GYNECOLOGICAL EXAMINA: HCPCS | Performed by: OBSTETRICS & GYNECOLOGY

## 2025-03-12 PROCEDURE — 87491 CHLMYD TRACH DNA AMP PROBE: CPT | Performed by: OBSTETRICS & GYNECOLOGY

## 2025-03-12 PROCEDURE — 87591 N.GONORRHOEAE DNA AMP PROB: CPT | Performed by: OBSTETRICS & GYNECOLOGY

## 2025-03-12 PROCEDURE — G0145 SCR C/V CYTO,THINLAYER,RESCR: HCPCS | Performed by: OBSTETRICS & GYNECOLOGY

## 2025-03-12 NOTE — PROGRESS NOTES
"Peri Guido is a 24 y.o. female who presents for annual well woman exam. Periods are regular every 28-30 days, lasting 5 days. No intermenstrual bleeding, spotting, or discharge.    Current contraception: none  History of abnormal Pap smear: no  Family history of uterine or ovarian cancer: no  Family history of breast cancer: yes - pGM    Menstrual History:  OB History          3    Para   1    Term   1       0    AB   1    Living   1         SAB   1    IAB   0    Ectopic   0    Multiple        Live Births   1                  Patient's last menstrual period was 2025 (exact date).  Period Cycle (Days): 28  Period Duration (Days): 5  Period Pattern: Regular  Menstrual Flow: Heavy  Dysmenorrhea: None    The following portions of the patient's history were reviewed and updated as appropriate: allergies, current medications, past family history, past medical history, past social history, past surgical history, and problem list.    Review of Systems  Review of Systems   Constitutional:  Negative for activity change, appetite change, chills, fatigue and fever.   Respiratory:  Negative for apnea, cough, chest tightness and shortness of breath.    Cardiovascular:  Negative for chest pain, palpitations and leg swelling.   Gastrointestinal:  Negative for abdominal pain, constipation, diarrhea, nausea and vomiting.   Genitourinary:  Negative for difficulty urinating, dysuria, flank pain, frequency, hematuria and urgency.   Neurological:  Negative for dizziness, seizures, syncope, light-headedness, numbness and headaches.   Psychiatric/Behavioral:  Negative for agitation and confusion.           Objective      /76 (BP Location: Left arm, Patient Position: Sitting, Cuff Size: Adult)   Ht 5' 7\" (1.702 m)   Wt 54.5 kg (120 lb 3.2 oz)   LMP 2025 (Exact Date)   BMI 18.83 kg/m²     Physical Exam  OBGyn Exam     General:   alert and oriented, in no acute distress, alert, appears " stated age, and cooperative   Heart: regular rate and rhythm, S1, S2 normal, no murmur, click, rub or gallop   Lungs: clear to auscultation bilaterally   Abdomen: soft, non-tender, without masses or organomegaly   Vulva: normal   Vagina: normal mucosa, normal discharge   Cervix: no cervical motion tenderness and no lesions   Uterus: normal size   Adnexa:  Breast Exam:  normal adnexa  breasts appear normal, no suspicious masses, no skin or nipple changes or axillary nodes.                Assessment      @well woman@ .    24-year-old female  Annual exam  Prior vaginal delivery  History of prior miscarriage  Trying to conceive for more than 1 year  Plan  Pap/reflex  GC/CT  Prenatal vitamin daily  To go for pelvic ultrasound  Partner semen analysis  Return to office in 2 months follow-up and discussed result and consider trial of ovulation induction if ultrasound and semen analysis is normal     All questions answered.     There are no Patient Instructions on file for this visit.

## 2025-03-13 LAB
C TRACH DNA SPEC QL NAA+PROBE: NEGATIVE
N GONORRHOEA DNA SPEC QL NAA+PROBE: NEGATIVE

## 2025-03-14 ENCOUNTER — RESULTS FOLLOW-UP (OUTPATIENT)
Dept: OBGYN CLINIC | Facility: CLINIC | Age: 25
End: 2025-03-14

## 2025-03-18 LAB
LAB AP GYN PRIMARY INTERPRETATION: NORMAL
Lab: NORMAL
PATH INTERP SPEC-IMP: NORMAL

## 2025-04-15 ENCOUNTER — HOSPITAL ENCOUNTER (OUTPATIENT)
Dept: RADIOLOGY | Facility: MEDICAL CENTER | Age: 25
Discharge: HOME | End: 2025-04-15
Attending: OBSTETRICS & GYNECOLOGY
Payer: COMMERCIAL

## 2025-04-15 DIAGNOSIS — Z78.9 TRYING TO GET PREGNANT: ICD-10-CM

## 2025-04-15 PROCEDURE — 76856 US EXAM PELVIC COMPLETE: CPT

## 2025-04-15 PROCEDURE — 76830 TRANSVAGINAL US NON-OB: CPT
